# Patient Record
Sex: FEMALE | Race: WHITE | Employment: OTHER | URBAN - METROPOLITAN AREA
[De-identification: names, ages, dates, MRNs, and addresses within clinical notes are randomized per-mention and may not be internally consistent; named-entity substitution may affect disease eponyms.]

---

## 2018-09-13 PROBLEM — F32.A MILD DEPRESSION: Status: ACTIVE | Noted: 2018-09-13

## 2022-02-14 ENCOUNTER — HOSPITAL ENCOUNTER (OUTPATIENT)
Dept: GENERAL RADIOLOGY | Age: 72
Discharge: HOME | End: 2022-02-14
Payer: MEDICARE

## 2022-02-14 DIAGNOSIS — M54.16 LUMBAR RADICULOPATHY: ICD-10-CM

## 2022-02-14 DIAGNOSIS — M54.50 LOW BACK PAIN: ICD-10-CM

## 2022-02-14 PROCEDURE — 72110 X-RAY EXAM L-2 SPINE 4/>VWS: CPT

## 2022-02-15 ENCOUNTER — TRANSCRIBE ORDER (OUTPATIENT)
Dept: SCHEDULING | Age: 72
End: 2022-02-15

## 2022-02-15 DIAGNOSIS — M54.16 LUMBAR RADICULOPATHY: Primary | ICD-10-CM

## 2022-02-24 ENCOUNTER — NURSE TRIAGE (OUTPATIENT)
Dept: OTHER | Facility: CLINIC | Age: 72
End: 2022-02-24

## 2022-02-24 NOTE — TELEPHONE ENCOUNTER
Received call from [address] at [facility] with Red Flag Complaint. Subjective: Caller states \"I was hurting yesterday and I was weak and I slid off the couch. I just have had a fever all night, and my eyes hurt (chronic dry eyes), my nose is congested. \"     Current Symptoms: weakness, body pain, congestion, eyes are dry, fever of 102 (at 0215)    Onset: 1 day ago; gradual    Associated Symptoms: NA    Pain Severity: 8/10; aching; Chronic and additional pain    Temperature: 102 orally    What has been tried: Asprin    LMP: NA Pregnant: NA    Recommended disposition: to be seen to within the next 4 hours    Care advice provided, patient verbalizes understanding; denies any other questions or concerns; instructed to call back for any new or worsening symptoms. Patient/Caller agrees with recommended disposition; writer provided warm transfer to [facility] at [facility] for appointment scheduling    Attention Provider: Thank you for allowing me to participate in the care of your patient. The patient was connected to triage in response to information provided to the [facility]. Please do not respond through this encounter as the response is not directed to a shared pool.       Reason for Disposition   [1] Fever > 101 F (38.3 C) AND [2] age > 60 years    Protocols used: CORONAVIRUS (COVID-19) DIAGNOSED OR SUSPECTED-ADULT-AH

## 2022-03-19 PROBLEM — F32.A MILD DEPRESSION: Status: ACTIVE | Noted: 2018-09-13

## 2022-03-26 ENCOUNTER — NURSE TRIAGE (OUTPATIENT)
Dept: OTHER | Facility: CLINIC | Age: 72
End: 2022-03-26

## 2022-03-26 NOTE — TELEPHONE ENCOUNTER
Received call from [place] at [facility], caller not on line. Complaint: Leg pain from known DVT    Practice Name: [facility]    Caller's telephone number verified as [phone]    Connected with caller via phone, please see below triage       Subjective: Caller states \"Leg pain\"     Current Symptoms: Seen in the ED 3/23, dx left leg DVT. Started having reflux this afternoon and left leg pain getting worse. Swelling started back up today, states she has been more active yesterday. Onset: a few days ago; worsening    Associated Symptoms: NA    Pain Severity: 6/10; pain; constant    Temperature: denies fever    What has been tried: None    LMP: NA Pregnant: NA    Recommended disposition: See HCP within 4 Hours (or PCP triage). Advised to go to ED given that office is closed. Care advice provided, patient verbalizes understanding; denies any other questions or concerns; instructed to call back for any new or worsening symptoms. Patient/caller agrees to proceed to nearest Emergency Department    Attention Provider: Thank you for allowing me to participate in the care of your patient. The patient was connected to triage in response to information provided to the [facility]. Please do not respond through this encounter as the response is not directed to a shared pool.       Reason for Disposition   History of prior \"blood clot\" in leg or lungs (i.e., deep vein thrombosis, pulmonary embolism)    Protocols used: LEG PAIN-ADULT-AH

## 2022-05-24 ENCOUNTER — OFFICE VISIT (OUTPATIENT)
Dept: FAMILY MEDICINE CLINIC | Facility: CLINIC | Age: 72
End: 2022-05-24
Payer: MEDICARE

## 2022-05-24 VITALS
DIASTOLIC BLOOD PRESSURE: 66 MMHG | BODY MASS INDEX: 29.19 KG/M2 | WEIGHT: 171 LBS | SYSTOLIC BLOOD PRESSURE: 120 MMHG | HEIGHT: 64 IN

## 2022-05-24 DIAGNOSIS — R63.1 POLYDIPSIA: ICD-10-CM

## 2022-05-24 DIAGNOSIS — K21.9 GASTROESOPHAGEAL REFLUX DISEASE WITHOUT ESOPHAGITIS: ICD-10-CM

## 2022-05-24 DIAGNOSIS — R35.89 POLYURIA: Primary | ICD-10-CM

## 2022-05-24 LAB
BILIRUBIN, URINE, POC: NEGATIVE
BLOOD URINE, POC: NEGATIVE
EST. AVERAGE GLUCOSE BLD GHB EST-MCNC: 103 MG/DL
GLUCOSE SERPL-MCNC: 108 MG/DL (ref 65–100)
GLUCOSE URINE, POC: NEGATIVE
HBA1C MFR BLD: 5.2 % (ref 4.2–6.3)
KETONES, URINE, POC: NEGATIVE
LEUKOCYTE ESTERASE, URINE, POC: NEGATIVE
NITRITE, URINE, POC: NEGATIVE
PH, URINE, POC: 7 (ref 4.6–8)
PROTEIN,URINE, POC: NEGATIVE
SPECIFIC GRAVITY, URINE, POC: 1.01 (ref 1–1.03)
URINALYSIS CLARITY, POC: CLEAR
URINALYSIS COLOR, POC: NORMAL
UROBILINOGEN, POC: NORMAL

## 2022-05-24 PROCEDURE — 1090F PRES/ABSN URINE INCON ASSESS: CPT | Performed by: FAMILY MEDICINE

## 2022-05-24 PROCEDURE — 1036F TOBACCO NON-USER: CPT | Performed by: FAMILY MEDICINE

## 2022-05-24 PROCEDURE — G8417 CALC BMI ABV UP PARAM F/U: HCPCS | Performed by: FAMILY MEDICINE

## 2022-05-24 PROCEDURE — G8428 CUR MEDS NOT DOCUMENT: HCPCS | Performed by: FAMILY MEDICINE

## 2022-05-24 PROCEDURE — G8400 PT W/DXA NO RESULTS DOC: HCPCS | Performed by: FAMILY MEDICINE

## 2022-05-24 PROCEDURE — 3017F COLORECTAL CA SCREEN DOC REV: CPT | Performed by: FAMILY MEDICINE

## 2022-05-24 PROCEDURE — 99214 OFFICE O/P EST MOD 30 MIN: CPT | Performed by: FAMILY MEDICINE

## 2022-05-24 PROCEDURE — 81001 URINALYSIS AUTO W/SCOPE: CPT | Performed by: FAMILY MEDICINE

## 2022-05-24 PROCEDURE — 1123F ACP DISCUSS/DSCN MKR DOCD: CPT | Performed by: FAMILY MEDICINE

## 2022-05-24 RX ORDER — TRAMADOL HYDROCHLORIDE 50 MG/1
TABLET ORAL
COMMUNITY
Start: 2022-04-29

## 2022-05-24 RX ORDER — TRIAMCINOLONE ACETONIDE 1 MG/G
CREAM TOPICAL
COMMUNITY
Start: 2022-02-22

## 2022-05-24 RX ORDER — HYDROXYCHLOROQUINE SULFATE 200 MG/1
TABLET, FILM COATED ORAL DAILY
COMMUNITY
Start: 2015-09-24

## 2022-05-24 RX ORDER — PANTOPRAZOLE SODIUM 40 MG/1
40 TABLET, DELAYED RELEASE ORAL DAILY
Qty: 90 TABLET | Refills: 2 | Status: SHIPPED | OUTPATIENT
Start: 2022-05-24 | End: 2022-06-09 | Stop reason: SDUPTHER

## 2022-05-24 RX ORDER — CYCLOSPORINE 0.5 MG/ML
EMULSION OPHTHALMIC
COMMUNITY
Start: 2019-09-21

## 2022-05-24 ASSESSMENT — ENCOUNTER SYMPTOMS
COUGH: 0
SINUS PAIN: 0
DIARRHEA: 0
ABDOMINAL PAIN: 0
SHORTNESS OF BREATH: 0
RHINORRHEA: 0

## 2022-05-24 NOTE — PROGRESS NOTES
PROGRESS NOTE    SUBJECTIVE:   Layla Sotomayor is a 73 y.o. female seen for a follow up visit regarding the following chief complaint:     Chief Complaint   Patient presents with    Cystitis     increase urinary frecuency, denies dysuria           HPI  Patient presents office complaining of increased urinary frequency polyuria polydipsia and wants to know if COVID can give you diabetes even though she has a family history of diabetes we had a long discussion about that by the way    Past Medical History, Past Surgical History, Family history, Social History, and Medications were all reviewed with the patient today and updated as necessary. Current Outpatient Medications   Medication Sig Dispense Refill    apixaban (ELIQUIS) 5 MG TABS tablet Take 5 mg by mouth 2 times daily      cycloSPORINE (RESTASIS) 0.05 % ophthalmic emulsion       hydroxychloroquine (PLAQUENIL) 200 mg tablet       traMADol (ULTRAM) 50 MG tablet TAKE 1 TABLET BY MOUTH 4 TIMES DAILY AS NEEDED FOR PAIN      triamcinolone (KENALOG) 0.1 % cream MIX WITH ANY MOISTURIZER AND APPLY CREAM EXTERNALLY TO AFFECTED AREA THREE TIMES DAILY      pantoprazole (PROTONIX) 40 MG tablet Take 1 tablet by mouth daily TAKE 1 TABLET EVERY DAY 90 tablet 2    Calcium Carbonate-Vitamin D (OYSTER SHELL CALCIUM/D) 500-200 MG-UNIT TABS Take by mouth      vitamin D3 (CHOLECALCIFEROL) 10 MCG (400 UNIT) TABS tablet Take by mouth      citalopram (CELEXA) 40 MG tablet Take 40 mg by mouth      diazePAM (VALIUM) 10 MG tablet Take 10 mg by mouth.  fluticasone (FLONASE) 50 MCG/ACT nasal spray 2 sprays in each nostril daily      gabapentin (NEURONTIN) 400 MG capsule Take 400 mg by mouth 4 times daily.       levothyroxine (SYNTHROID) 50 MCG tablet TAKE 1 TABLET BY MOUTH DAILY (BEFORE BREAKFAST) FOR HYPOTHYROIDISM      loperamide (IMODIUM) 2 MG capsule Take 2 mg by mouth 4 times daily as needed      naloxone 4 MG/0.1ML LIQD nasal spray ADMINISTER A SINGLE SPRAY IN ONE NOSTRIL UPON SIGNS OF OPIOID OVERDOSE. CALL 911. REPEAT AFTER 3 MINUTES IF NO RESPONSE.  risperiDONE (RISPERDAL) 1 MG tablet Take 1 mg by mouth      spironolactone (ALDACTONE) 50 MG tablet Take 1 tablet by mouth 3 times daily       No current facility-administered medications for this visit.      Allergies   Allergen Reactions    Hydrocodone-Acetaminophen Other (See Comments)    Thimerosal Shortness Of Breath and Other (See Comments)     Localized redness  Other reaction(s): redness/burning    Adhesive Tape Other (See Comments)     localized redness and blisters - paper tape is ok    Codeine Itching     Patient Active Problem List   Diagnosis    Chronic pain    Autoimmune disease ([facility] [address]    GERD (gastroesophageal reflux disease)    CTS (carpal tunnel syndrome)    Anxiety    Acquired hypothyroidism    Depression    TMJ (temporomandibular joint syndrome)    Fibromyalgia    Raynaud phenomenon    Mild depression (HCC)    Sleep apnea    Psychotic disorder (HCC)    IBS (irritable bowel syndrome)    Arthritis    Sjogren-Belkys syndrome     Past Medical History:   Diagnosis Date    Acquired hypothyroidism     Anxiety     Arthritis     Back and extremities    Autoimmune disease (HCC)     Chronic pain     Fibromyalgia    CTS (carpal tunnel syndrome)     Depression     Fibromyalgia     GERD (gastroesophageal reflux disease)     Also has IBS    IBS (irritable bowel syndrome)     Menopause     Other ill-defined conditions(799.89)     Raynaud's and peripheral neuropathy    Psychotic disorder (HCC)     sechizophrinia    Raynaud phenomenon     Sjogren-Belkys syndrome     Sleep apnea     Thyroid disease     Hypothyroid    TMJ (temporomandibular joint syndrome)     Unspecified sleep apnea     uses CPAP - will bring DOS    Varicose veins      Past Surgical History:   Procedure Laterality Date    BREAST SURGERY  1983 - placed 1983    breast implants - removed 2005    CHOLECYSTECTOMY 2008    COLONOSCOPY  10/30/2014    Dr. Bjorn Cushing in 10 years.  GI      anal sphincter repair x 2     HEENT  1970's    deviated septum repair    US BREAST NEEDLE BIOPSY LEFT Left 2021    US GUIDE INTACT BREAST BIOPSY LT [facility] AMB HISTORICAL     Family History   Problem Relation Age of Onset    Other Neg Hx     Post-op Cognitive Dysfunction Neg Hx     Emergence Delirium Neg Hx     Post-op Nausea/Vomiting Neg Hx     Delayed Awakening Neg Hx     Pseudochol. Deficiency Neg Hx     Malig Hypertherm Neg Hx     Tuberculosis Other     No Known Problems Mother     No Known Problems Father      Social History     Tobacco Use    Smoking status: Former Smoker     Packs/day: 0.05     Start date: 2001     Quit date: 2008     Years since quittin.6    Smokeless tobacco: Never Used    Tobacco comment: Quit smoking: currently quitting   Substance Use Topics    Alcohol use: No     Alcohol/week: 0.0 standard drinks         Review of Systems   Constitutional: Negative for chills, fatigue and fever. HENT: Negative for congestion, rhinorrhea and sinus pain. Eyes: Negative for visual disturbance. Respiratory: Negative for cough and shortness of breath. Cardiovascular: Negative for chest pain. Gastrointestinal: Negative for abdominal pain and diarrhea. Endocrine: Positive for polydipsia and polyuria. Genitourinary: Negative for dysuria. Musculoskeletal: Negative for arthralgias and myalgias. Neurological: Negative for dizziness, weakness and headaches. Psychiatric/Behavioral: Negative. OBJECTIVE:  /66 (Site: Right Lower Arm, Position: Sitting, Cuff Size: Small Adult)   Ht 5' 4\" (1.626 m)   Wt 171 lb (77.6 kg)   BMI 29.35 kg/m²      Physical Exam  Constitutional:       Appearance: Normal appearance. HENT:      Head: Normocephalic and atraumatic. Cardiovascular:      Pulses: Normal pulses. Heart sounds: Normal heart sounds.    Pulmonary: Effort: Pulmonary effort is normal.      Breath sounds: Normal breath sounds. Skin:     General: Skin is warm and dry. Neurological:      General: No focal deficit present. Mental Status: She is alert and oriented to person, place, and time. Mental status is at baseline. Medical problems and test results were reviewed with the patient today. Recent Results (from the past 672 hour(s))   AMB POC URINALYSIS DIP STICK AUTO W/ MICRO    Collection Time: 05/24/22  2:24 PM   Result Value Ref Range    Color (UA POC) Light Yellow     Clarity (UA POC) Clear     Glucose, Urine, POC Negative Negative    Bilirubin, Urine, POC Negative Negative    KETONES, Urine, POC Negative Negative    Specific Gravity, Urine, POC 1.015 1.001 - 1.035    Blood (UA POC) Negative Negative    pH, Urine, POC 7.0 4.6 - 8.0    Protein, Urine, POC Negative Negative    Urobilinogen, POC 0.2 mg/dL     Nitrite, Urine, POC Negative Negative    Leukocyte Esterase, Urine, POC Negative Negative       ASSESSMENT and PLAN    Visit Diagnoses and Associated Orders     Polyuria    -  Primary    AMB POC URINALYSIS DIP STICK AUTO W/ MICRO [09816 CPT(R)]      Glucose, Random [56753 Custom]           Gastroesophageal reflux disease without esophagitis        pantoprazole (PROTONIX) 40 MG tablet [56196]           Polydipsia        Glucose, Random [12528 Custom]           ORDERS WITHOUT AN ASSOCIATED DIAGNOSIS    apixaban (ELIQUIS) 5 MG TABS tablet [662280]      cycloSPORINE (RESTASIS) 0.05 % ophthalmic emulsion [21901]      hydroxychloroquine (PLAQUENIL) 200 mg tablet [70170]      traMADol (ULTRAM) 50 MG tablet [03934]      triamcinolone (KENALOG) 0.1 % cream [8113]               Charlene Cortes was seen today for cystitis. Diagnoses and all orders for this visit:    Polyuria  -     AMB POC URINALYSIS DIP STICK AUTO W/ MICRO  -     Glucose, Random    Gastroesophageal reflux disease without esophagitis  -     pantoprazole (PROTONIX) 40 MG tablet;  Take 1 tablet by mouth daily TAKE 1 TABLET EVERY DAY    Polydipsia  -     Glucose, Random

## 2022-05-29 DIAGNOSIS — E55.9 VITAMIN D DEFICIENCY: ICD-10-CM

## 2022-05-29 DIAGNOSIS — Z11.59 NEED FOR HEPATITIS C SCREENING TEST: ICD-10-CM

## 2022-05-29 DIAGNOSIS — Z00.00 MEDICARE ANNUAL WELLNESS VISIT, SUBSEQUENT: ICD-10-CM

## 2022-05-29 DIAGNOSIS — E03.9 ACQUIRED HYPOTHYROIDISM: Primary | ICD-10-CM

## 2022-06-01 DIAGNOSIS — E03.9 ACQUIRED HYPOTHYROIDISM: ICD-10-CM

## 2022-06-01 DIAGNOSIS — Z00.00 MEDICARE ANNUAL WELLNESS VISIT, SUBSEQUENT: ICD-10-CM

## 2022-06-01 DIAGNOSIS — Z11.59 NEED FOR HEPATITIS C SCREENING TEST: ICD-10-CM

## 2022-06-01 DIAGNOSIS — E55.9 VITAMIN D DEFICIENCY: ICD-10-CM

## 2022-06-01 LAB
25(OH)D3 SERPL-MCNC: 47.4 NG/ML (ref 30–100)
ALBUMIN SERPL-MCNC: 4 G/DL (ref 3.2–4.6)
ALBUMIN/GLOB SERPL: 1.2 {RATIO} (ref 1.2–3.5)
ALP SERPL-CCNC: 90 U/L (ref 50–136)
ALT SERPL-CCNC: 34 U/L (ref 12–65)
ANION GAP SERPL CALC-SCNC: 9 MMOL/L (ref 7–16)
AST SERPL-CCNC: 27 U/L (ref 15–37)
BASOPHILS # BLD: 0.1 K/UL (ref 0–0.2)
BASOPHILS NFR BLD: 1 % (ref 0–2)
BILIRUB SERPL-MCNC: 0.8 MG/DL (ref 0.2–1.1)
BUN SERPL-MCNC: 11 MG/DL (ref 8–23)
CALCIUM SERPL-MCNC: 9.2 MG/DL (ref 8.3–10.4)
CHLORIDE SERPL-SCNC: 102 MMOL/L (ref 98–107)
CHOLEST SERPL-MCNC: 203 MG/DL
CO2 SERPL-SCNC: 28 MMOL/L (ref 21–32)
CREAT SERPL-MCNC: 1.2 MG/DL (ref 0.6–1)
DIFFERENTIAL METHOD BLD: NORMAL
EOSINOPHIL # BLD: 0.2 K/UL (ref 0–0.8)
EOSINOPHIL NFR BLD: 3 % (ref 0.5–7.8)
ERYTHROCYTE [DISTWIDTH] IN BLOOD BY AUTOMATED COUNT: 12.6 % (ref 11.9–14.6)
GLOBULIN SER CALC-MCNC: 3.3 G/DL (ref 2.3–3.5)
GLUCOSE SERPL-MCNC: 96 MG/DL (ref 65–100)
HCT VFR BLD AUTO: 44.4 % (ref 35.8–46.3)
HCV AB SER QL: NONREACTIVE
HDLC SERPL-MCNC: 50 MG/DL (ref 40–60)
HDLC SERPL: 4.1 {RATIO}
HGB BLD-MCNC: 14.7 G/DL (ref 11.7–15.4)
IMM GRANULOCYTES # BLD AUTO: 0 K/UL (ref 0–0.5)
IMM GRANULOCYTES NFR BLD AUTO: 1 % (ref 0–5)
LDLC SERPL CALC-MCNC: ABNORMAL MG/DL
LDLC SERPL DIRECT ASSAY-MCNC: 101 MG/DL
LYMPHOCYTES # BLD: 2 K/UL (ref 0.5–4.6)
LYMPHOCYTES NFR BLD: 28 % (ref 13–44)
MCH RBC QN AUTO: 31.2 PG (ref 26.1–32.9)
MCHC RBC AUTO-ENTMCNC: 33.1 G/DL (ref 31.4–35)
MCV RBC AUTO: 94.3 FL (ref 79.6–97.8)
MONOCYTES # BLD: 0.9 K/UL (ref 0.1–1.3)
MONOCYTES NFR BLD: 12 % (ref 4–12)
NEUTS SEG # BLD: 3.9 K/UL (ref 1.7–8.2)
NEUTS SEG NFR BLD: 55 % (ref 43–78)
NRBC # BLD: 0 K/UL (ref 0–0.2)
PLATELET # BLD AUTO: 257 K/UL (ref 150–450)
PMV BLD AUTO: 10.4 FL (ref 9.4–12.3)
POTASSIUM SERPL-SCNC: 4.1 MMOL/L (ref 3.5–5.1)
PROT SERPL-MCNC: 7.3 G/DL (ref 6.3–8.2)
RBC # BLD AUTO: 4.71 M/UL (ref 4.05–5.2)
SODIUM SERPL-SCNC: 139 MMOL/L (ref 136–145)
TRIGL SERPL-MCNC: 517 MG/DL (ref 35–150)
TSH, 3RD GENERATION: 1.53 UIU/ML (ref 0.36–3.74)
VLDLC SERPL CALC-MCNC: 103.4 MG/DL (ref 6–23)
WBC # BLD AUTO: 7.2 K/UL (ref 4.3–11.1)

## 2022-06-03 ENCOUNTER — TELEMEDICINE (OUTPATIENT)
Dept: FAMILY MEDICINE CLINIC | Facility: CLINIC | Age: 72
End: 2022-06-03
Payer: MEDICARE

## 2022-06-03 DIAGNOSIS — R73.9 HYPERGLYCEMIA: Primary | ICD-10-CM

## 2022-06-03 DIAGNOSIS — Z00.00 ROUTINE GENERAL MEDICAL EXAMINATION AT A HEALTH CARE FACILITY: ICD-10-CM

## 2022-06-03 PROCEDURE — 99442 PR PHYS/QHP TELEPHONE EVALUATION 11-20 MIN: CPT | Performed by: FAMILY MEDICINE

## 2022-06-03 ASSESSMENT — ENCOUNTER SYMPTOMS
VOMITING: 0
NAUSEA: 0
SHORTNESS OF BREATH: 0

## 2022-06-03 NOTE — PROGRESS NOTES
PROGRESS NOTE  This visit was conducted via the phone with patient's consent to the visit and all associated charges to reduce the patient's risk of exposure to COVID-19 and continuity of care for an established patient. She and/or her healthcare decision maker is aware that this patient-initiated phone encounter is a billable service, with coverage as determined by her insurance carrier. She is aware that she may receive a bill and has provided verbal consent to proceed: Yes    Vitals and physical exam deferred due to telephone visit. Total Time: minutes: 11-20 minutes. SUBJECTIVE:   Sergey Dominguez is a 73 y.o. female seen for a follow up visit regarding the following chief complaint:           HPI patient is doing a phone call visit to go over her lab results without complaints      Past Medical History, Past Surgical History, Family history, Social History, and Medications were all reviewed with the patient today and updated as necessary. Current Outpatient Medications   Medication Sig Dispense Refill    apixaban (ELIQUIS) 5 MG TABS tablet Take 5 mg by mouth 2 times daily      cycloSPORINE (RESTASIS) 0.05 % ophthalmic emulsion       hydroxychloroquine (PLAQUENIL) 200 mg tablet       traMADol (ULTRAM) 50 MG tablet TAKE 1 TABLET BY MOUTH 4 TIMES DAILY AS NEEDED FOR PAIN      triamcinolone (KENALOG) 0.1 % cream MIX WITH ANY MOISTURIZER AND APPLY CREAM EXTERNALLY TO AFFECTED AREA THREE TIMES DAILY      pantoprazole (PROTONIX) 40 MG tablet Take 1 tablet by mouth daily TAKE 1 TABLET EVERY DAY 90 tablet 2    Calcium Carbonate-Vitamin D (OYSTER SHELL CALCIUM/D) 500-200 MG-UNIT TABS Take by mouth      vitamin D3 (CHOLECALCIFEROL) 10 MCG (400 UNIT) TABS tablet Take by mouth      citalopram (CELEXA) 40 MG tablet Take 40 mg by mouth      diazePAM (VALIUM) 10 MG tablet Take 10 mg by mouth.       fluticasone (FLONASE) 50 MCG/ACT nasal spray 2 sprays in each nostril daily      gabapentin (NEURONTIN) 400 MG capsule Take 400 mg by mouth 4 times daily.  levothyroxine (SYNTHROID) 50 MCG tablet TAKE 1 TABLET BY MOUTH DAILY (BEFORE BREAKFAST) FOR HYPOTHYROIDISM      loperamide (IMODIUM) 2 MG capsule Take 2 mg by mouth 4 times daily as needed      naloxone 4 MG/0.1ML LIQD nasal spray ADMINISTER A SINGLE SPRAY IN ONE NOSTRIL UPON SIGNS OF OPIOID OVERDOSE. CALL 911. REPEAT AFTER 3 MINUTES IF NO RESPONSE.  risperiDONE (RISPERDAL) 1 MG tablet Take 1 mg by mouth      spironolactone (ALDACTONE) 50 MG tablet Take 1 tablet by mouth 3 times daily       No current facility-administered medications for this visit.      Allergies   Allergen Reactions    Hydrocodone-Acetaminophen Other (See Comments)    Thimerosal Shortness Of Breath and Other (See Comments)     Localized redness  Other reaction(s): redness/burning    Adhesive Tape Other (See Comments)     localized redness and blisters - paper tape is ok    Codeine Itching     Patient Active Problem List   Diagnosis    Chronic pain    Autoimmune disease ([facility] [address]    GERD (gastroesophageal reflux disease)    CTS (carpal tunnel syndrome)    Anxiety    Acquired hypothyroidism    Depression    TMJ (temporomandibular joint syndrome)    Fibromyalgia    Raynaud phenomenon    Mild depression (HCC)    Sleep apnea    Psychotic disorder (HCC)    IBS (irritable bowel syndrome)    Arthritis    Sjogren-Belkys syndrome     Past Medical History:   Diagnosis Date    Acquired hypothyroidism     Anxiety     Arthritis     Back and extremities    Autoimmune disease (HCC)     Chronic pain     Fibromyalgia    CTS (carpal tunnel syndrome)     Depression     Fibromyalgia     GERD (gastroesophageal reflux disease)     Also has IBS    IBS (irritable bowel syndrome)     Menopause     Other ill-defined conditions(077.36)     Raynaud's and peripheral neuropathy    Psychotic disorder (HCC)     sechizophrinia    Raynaud phenomenon     Sjogren-Belkys syndrome     Sleep apnea     Thyroid disease     Hypothyroid    TMJ (temporomandibular joint syndrome)     Unspecified sleep apnea     uses CPAP - will bring DOS    Varicose veins      Past Surgical History:   Procedure Laterality Date   [address] - placed     breast implants - removed     CHOLECYSTECTOMY  2008    COLONOSCOPY  10/30/2014    Dr. Daisy Alfonso in 10 years.  GI      anal sphincter repair x 2     HEENT      deviated septum repair    US BREAST NEEDLE BIOPSY LEFT Left 2021    US GUIDE INTACT BREAST BIOPSY LT [facility] AMB HISTORICAL     Family History   Problem Relation Age of Onset    Other Neg Hx     Post-op Cognitive Dysfunction Neg Hx     Emergence Delirium Neg Hx     Post-op Nausea/Vomiting Neg Hx     Delayed Awakening Neg Hx     Pseudochol. Deficiency Neg Hx     Malig Hypertherm Neg Hx     Tuberculosis Other     No Known Problems Mother     No Known Problems Father      Social History     Tobacco Use    Smoking status: Former Smoker     Packs/day: 0.05     Start date: 2001     Quit date: 2008     Years since quittin.6    Smokeless tobacco: Never Used    Tobacco comment: Quit smoking: currently quitting   Substance Use Topics    Alcohol use: No     Alcohol/week: 0.0 standard drinks         Review of Systems   Constitutional: Negative for fatigue and fever. Respiratory: Negative for shortness of breath. Cardiovascular: Negative for chest pain. Gastrointestinal: Negative for nausea and vomiting. OBJECTIVE:  There were no vitals taken for this visit. Physical Exam     Medical problems and test results were reviewed with the patient today.      Recent Results (from the past 672 hour(s))   AMB POC URINALYSIS DIP STICK AUTO W/ MICRO    Collection Time: 22  2:24 PM   Result Value Ref Range    Color (UA POC) Light Yellow     Clarity (UA POC) Clear     Glucose, Urine, POC Negative Negative    Bilirubin, Urine, POC Negative Negative    KETONES, Urine, POC Negative Negative    Specific Gravity, Urine, POC 1.015 1.001 - 1.035    Blood (UA POC) Negative Negative    pH, Urine, POC 7.0 4.6 - 8.0    Protein, Urine, POC Negative Negative    Urobilinogen, POC 0.2 mg/dL     Nitrite, Urine, POC Negative Negative    Leukocyte Esterase, Urine, POC Negative Negative   Glucose, Random    Collection Time: 05/24/22  3:29 PM   Result Value Ref Range    Glucose 108 (H) 65 - 100 mg/dL   Hemoglobin A1C    Collection Time: 05/24/22  3:29 PM   Result Value Ref Range    Hemoglobin A1C 5.2 4.20 - 6.30 %    eAG 103 mg/dL   TSH    Collection Time: 06/01/22  7:51 AM   Result Value Ref Range    TSH, 3RD GENERATION 1.530 0.358 - 3.740 uIU/mL   Hepatitis C Antibody    Collection Time: 06/01/22  7:51 AM   Result Value Ref Range    Hepatitis C Ab NONREACTIVE NONREACTIVE     Vitamin D 25 Hydroxy    Collection Time: 06/01/22  7:51 AM   Result Value Ref Range    Vit D, 25-Hydroxy 47.4 30.0 - 100.0 ng/mL   Lipid Panel    Collection Time: 06/01/22  7:51 AM   Result Value Ref Range    Cholesterol, Total 203 (H) <200 MG/DL    Triglycerides 517 (H) 35 - 150 MG/DL    HDL 50 40 - 60 MG/DL    LDL Calculated Not calculated due to elevated triglyceride level <100 MG/DL    VLDL Cholesterol Calculated 103.4 (H) 6.0 - 23.0 MG/DL    Chol/HDL Ratio 4.1     Comprehensive Metabolic Panel    Collection Time: 06/01/22  7:51 AM   Result Value Ref Range    Sodium 139 136 - 145 mmol/L    Potassium 4.1 3.5 - 5.1 mmol/L    Chloride 102 98 - 107 mmol/L    CO2 28 21 - 32 mmol/L    Anion Gap 9 7 - 16 mmol/L    Glucose 96 65 - 100 mg/dL    BUN 11 8 - 23 MG/DL    CREATININE 1.20 (H) 0.6 - 1.0 MG/DL    GFR  57 (L) >60 ml/min/1.73m2    GFR Non- 47 (L) >60 ml/min/1.73m2    Calcium 9.2 8.3 - 10.4 MG/DL    Total Bilirubin 0.8 0.2 - 1.1 MG/DL    ALT 34 12 - 65 U/L    AST 27 15 - 37 U/L    Alk Phosphatase 90 50 - 136 U/L    Total Protein 7.3 6.3 - 8.2 g/dL    Albumin 4.0 3.2 - 4.6 g/dL    Globulin 3.3 2.3 - 3.5 g/dL    Albumin/Globulin Ratio 1.2 1.2 - 3.5     CBC with Auto Differential    Collection Time: 06/01/22  7:51 AM   Result Value Ref Range    WBC 7.2 4.3 - 11.1 K/uL    RBC 4.71 4.05 - 5.2 M/uL    Hemoglobin 14.7 11.7 - 15.4 g/dL    Hematocrit 44.4 35.8 - 46.3 %    MCV 94.3 79.6 - 97.8 FL    MCH 31.2 26.1 - 32.9 PG    MCHC 33.1 31.4 - 35.0 g/dL    RDW 12.6 11.9 - 14.6 %    Platelets 847 035 - 866 K/uL    MPV 10.4 9.4 - 12.3 FL    nRBC 0.00 0.0 - 0.2 K/uL    Differential Type AUTOMATED      Seg Neutrophils 55 43 - 78 %    Lymphocytes 28 13 - 44 %    Monocytes 12 4.0 - 12.0 %    Eosinophils % 3 0.5 - 7.8 %    Basophils 1 0.0 - 2.0 %    Immature Granulocytes 1 0.0 - 5.0 %    Segs Absolute 3.9 1.7 - 8.2 K/UL    Absolute Lymph # 2.0 0.5 - 4.6 K/UL    Absolute Mono # 0.9 0.1 - 1.3 K/UL    Absolute Eos # 0.2 0.0 - 0.8 K/UL    Basophils Absolute 0.1 0.0 - 0.2 K/UL    Absolute Immature Granulocyte 0.0 0.0 - 0.5 K/UL   LDL Cholesterol, Direct    Collection Time: 06/01/22  7:51 AM   Result Value Ref Range    LDL Direct 101 (H) <100 mg/dl       ASSESSMENT and PLAN    Visit Diagnoses and Associated Orders     Hyperglycemia    -  Primary                  Diagnoses and all orders for this visit:    Hyperglycemia

## 2022-06-09 ENCOUNTER — OFFICE VISIT (OUTPATIENT)
Dept: FAMILY MEDICINE CLINIC | Facility: CLINIC | Age: 72
End: 2022-06-09
Payer: MEDICARE

## 2022-06-09 VITALS
SYSTOLIC BLOOD PRESSURE: 120 MMHG | HEIGHT: 64 IN | WEIGHT: 171 LBS | BODY MASS INDEX: 29.19 KG/M2 | DIASTOLIC BLOOD PRESSURE: 80 MMHG

## 2022-06-09 DIAGNOSIS — F41.9 ANXIETY: ICD-10-CM

## 2022-06-09 DIAGNOSIS — I82.409 DEEP VEIN THROMBOSIS (DVT) OF LOWER EXTREMITY, UNSPECIFIED CHRONICITY, UNSPECIFIED LATERALITY, UNSPECIFIED VEIN (HCC): ICD-10-CM

## 2022-06-09 DIAGNOSIS — E78.2 MIXED HYPERLIPIDEMIA: ICD-10-CM

## 2022-06-09 DIAGNOSIS — Z12.11 SPECIAL SCREENING FOR MALIGNANT NEOPLASMS, COLON: ICD-10-CM

## 2022-06-09 DIAGNOSIS — K21.9 GASTROESOPHAGEAL REFLUX DISEASE WITHOUT ESOPHAGITIS: ICD-10-CM

## 2022-06-09 DIAGNOSIS — F33.0 MILD EPISODE OF RECURRENT MAJOR DEPRESSIVE DISORDER (HCC): ICD-10-CM

## 2022-06-09 DIAGNOSIS — Z13.31 SCREENING FOR DEPRESSION: ICD-10-CM

## 2022-06-09 DIAGNOSIS — E55.9 VITAMIN D DEFICIENCY: ICD-10-CM

## 2022-06-09 DIAGNOSIS — Z12.31 SCREENING MAMMOGRAM FOR HIGH-RISK PATIENT: ICD-10-CM

## 2022-06-09 DIAGNOSIS — Z00.00 MEDICARE ANNUAL WELLNESS VISIT, SUBSEQUENT: ICD-10-CM

## 2022-06-09 DIAGNOSIS — Z00.00 ROUTINE GENERAL MEDICAL EXAMINATION AT A HEALTH CARE FACILITY: Primary | ICD-10-CM

## 2022-06-09 DIAGNOSIS — E03.9 ACQUIRED HYPOTHYROIDISM: ICD-10-CM

## 2022-06-09 LAB
BILIRUBIN, URINE, POC: ABNORMAL
BLOOD URINE, POC: ABNORMAL
GLUCOSE URINE, POC: NEGATIVE
KETONES, URINE, POC: ABNORMAL
LEUKOCYTE ESTERASE, URINE, POC: NEGATIVE
NITRITE, URINE, POC: NEGATIVE
PH, URINE, POC: 5.5 (ref 4.6–8)
PROTEIN,URINE, POC: ABNORMAL
SPECIFIC GRAVITY, URINE, POC: 1.03 (ref 1–1.03)
URINALYSIS CLARITY, POC: CLEAR
URINALYSIS COLOR, POC: YELLOW
UROBILINOGEN, POC: 0.2

## 2022-06-09 PROCEDURE — G0439 PPPS, SUBSEQ VISIT: HCPCS | Performed by: FAMILY MEDICINE

## 2022-06-09 PROCEDURE — 3017F COLORECTAL CA SCREEN DOC REV: CPT | Performed by: FAMILY MEDICINE

## 2022-06-09 PROCEDURE — 1123F ACP DISCUSS/DSCN MKR DOCD: CPT | Performed by: FAMILY MEDICINE

## 2022-06-09 PROCEDURE — 81003 URINALYSIS AUTO W/O SCOPE: CPT | Performed by: FAMILY MEDICINE

## 2022-06-09 RX ORDER — CITALOPRAM 40 MG/1
40 TABLET ORAL DAILY
Qty: 90 TABLET | Refills: 3 | Status: SHIPPED | OUTPATIENT
Start: 2022-06-09

## 2022-06-09 RX ORDER — DIAZEPAM 10 MG/1
10 TABLET ORAL DAILY
Qty: 30 TABLET | Refills: 5 | Status: SHIPPED | OUTPATIENT
Start: 2022-06-29 | End: 2022-07-29

## 2022-06-09 RX ORDER — LEVOTHYROXINE SODIUM 0.05 MG/1
50 TABLET ORAL DAILY
Qty: 90 TABLET | Refills: 3 | Status: SHIPPED | OUTPATIENT
Start: 2022-06-09

## 2022-06-09 RX ORDER — PANTOPRAZOLE SODIUM 40 MG/1
40 TABLET, DELAYED RELEASE ORAL DAILY
Qty: 90 TABLET | Refills: 3 | Status: SHIPPED | OUTPATIENT
Start: 2022-06-09

## 2022-06-09 ASSESSMENT — PATIENT HEALTH QUESTIONNAIRE - PHQ9
2. FEELING DOWN, DEPRESSED OR HOPELESS: 0
7. TROUBLE CONCENTRATING ON THINGS, SUCH AS READING THE NEWSPAPER OR WATCHING TELEVISION: 0
4. FEELING TIRED OR HAVING LITTLE ENERGY: 0
10. IF YOU CHECKED OFF ANY PROBLEMS, HOW DIFFICULT HAVE THESE PROBLEMS MADE IT FOR YOU TO DO YOUR WORK, TAKE CARE OF THINGS AT HOME, OR GET ALONG WITH OTHER PEOPLE: 0
8. MOVING OR SPEAKING SO SLOWLY THAT OTHER PEOPLE COULD HAVE NOTICED. OR THE OPPOSITE, BEING SO FIGETY OR RESTLESS THAT YOU HAVE BEEN MOVING AROUND A LOT MORE THAN USUAL: 0
SUM OF ALL RESPONSES TO PHQ QUESTIONS 1-9: 0
5. POOR APPETITE OR OVEREATING: 0
6. FEELING BAD ABOUT YOURSELF - OR THAT YOU ARE A FAILURE OR HAVE LET YOURSELF OR YOUR FAMILY DOWN: 0
SUM OF ALL RESPONSES TO PHQ QUESTIONS 1-9: 0
3. TROUBLE FALLING OR STAYING ASLEEP: 0
9. THOUGHTS THAT YOU WOULD BE BETTER OFF DEAD, OR OF HURTING YOURSELF: 0

## 2022-06-09 NOTE — PATIENT INSTRUCTIONS
Personalized Preventive Plan for Saba Zavala - 6/9/2022  Medicare offers a range of preventive health benefits. Some of the tests and screenings are paid in full while other may be subject to a deductible, co-insurance, and/or copay. Some of these benefits include a comprehensive review of your medical history including lifestyle, illnesses that may run in your family, and various assessments and screenings as appropriate. After reviewing your medical record and screening and assessments performed today your provider may have ordered immunizations, labs, imaging, and/or referrals for you. A list of these orders (if applicable) as well as your Preventive Care list are included within your After Visit Summary for your review. Other Preventive Recommendations:    · A preventive eye exam performed by an eye specialist is recommended every 1-2 years to screen for glaucoma; cataracts, macular degeneration, and other eye disorders. · A preventive dental visit is recommended every 6 months. · Try to get at least 150 minutes of exercise per week or 10,000 steps per day on a pedometer . · Order or download the FREE \"Exercise & Physical Activity: Your Everyday Guide\" from [employer]. Call 3-822.350.8691 or search [employer] online. · You need 7961-1422 mg of calcium and 2889-7355 IU of vitamin D per day. It is possible to meet your calcium requirement with diet alone, but a vitamin D supplement is usually necessary to meet this goal.  · When exposed to the sun, use a sunscreen that protects against both UVA and UVB radiation with an SPF of 30 or greater. Reapply every 2 to 3 hours or after sweating, drying off with a towel, or swimming. · Always wear a seat belt when traveling in a car. Always wear a helmet when riding a bicycle or motorcycle.

## 2022-06-09 NOTE — PROGRESS NOTES
Medicare Annual Wellness Visit    Mikel Arteaga is here for Medicare AWV and Discuss Labs    Assessment & Plan   Routine general medical examination at a health care facility  -     AMB POC URINALYSIS DIP STICK AUTO W/O MICRO  Gastroesophageal reflux disease without esophagitis  The following orders have not been finalized:  -     pantoprazole (PROTONIX) 40 MG tablet  Medicare annual wellness visit, subsequent  Vitamin D deficiency  Acquired hypothyroidism  The following orders have not been finalized:  -     levothyroxine (SYNTHROID) 50 MCG tablet  Mild episode of recurrent major depressive disorder ([facility] [address]  Screening for depression  Anxiety  The following orders have not been finalized:  -     citalopram (CELEXA) 40 MG tablet  -     diazePAM (VALIUM) 10 MG tablet  Deep vein thrombosis (DVT) of lower extremity, unspecified chronicity, unspecified laterality, unspecified vein ([facility]  The following orders have not been finalized:  -     apixaban (ELIQUIS) 5 MG TABS tablet      Recommendations for Preventive Services Due: see orders and patient instructions/AVS.  Recommended screening schedule for the next 5-10 years is provided to the patient in written form: see Patient Instructions/AVS.     Return for Medicare Annual Wellness Visit in 1 year. Subjective   Presents to the office today for complete physical without complaints    Patient's complete Health Risk Assessment and screening values have been reviewed and are found in Flowsheets. The following problems were reviewed today and where indicated follow up appointments were made and/or referrals ordered.     Positive Risk Factor Screenings with Interventions:             General Health and ACP:       Advance Directives     Power of  Living Will ACP-Advance Directive ACP-Power of     Not on File Not on File Not on File Not on File      General Health Risk Interventions:  · None              Objective   Vitals:    06/09/22 1516   BP: 120/80 Site: Left Upper Arm   Position: Sitting   Cuff Size: Small Adult   Weight: 171 lb (77.6 kg)   Height: 5' 4\" (1.626 m)      Body mass index is 29.35 kg/m². General Appearance: alert and oriented to person, place and time, well developed and well- nourished, in no acute distress  Skin: warm and dry, no rash or erythema  Head: normocephalic and atraumatic  Eyes: pupils equal, round, and reactive to light, extraocular eye movements intact, conjunctivae normal  ENT: tympanic membrane, external ear and ear canal normal bilaterally, nose without deformity, nasal mucosa and turbinates normal without polyps  Neck: supple and non-tender without mass, no thyromegaly or thyroid nodules, no cervical lymphadenopathy  Pulmonary/Chest: clear to auscultation bilaterally- no wheezes, rales or rhonchi, normal air movement, no respiratory distress  Cardiovascular: normal rate, regular rhythm, normal S1 and S2, no murmurs, rubs, clicks, or gallops, distal pulses intact, no carotid bruits  Abdomen: soft, non-tender, non-distended, normal bowel sounds, no masses or organomegaly  Extremities: no cyanosis, clubbing or edema  Musculoskeletal: normal range of motion, no joint swelling, deformity or tenderness  Neurologic: reflexes normal and symmetric, no cranial nerve deficit, gait, coordination and speech normal       Allergies   Allergen Reactions    Hydrocodone-Acetaminophen Other (See Comments)    Thimerosal Shortness Of Breath and Other (See Comments)     Localized redness  Other reaction(s): redness/burning    Adhesive Tape Other (See Comments)     localized redness and blisters - paper tape is ok    Codeine Itching     Prior to Visit Medications    Medication Sig Taking?  Authorizing Provider   apixaban (ELIQUIS) 5 MG TABS tablet Take 5 mg by mouth 2 times daily Yes Historical Provider, MD   cycloSPORINE (RESTASIS) 0.05 % ophthalmic emulsion  Yes Historical Provider, MD   hydroxychloroquine (PLAQUENIL) 200 mg tablet  Yes Historical Provider, MD   traMADol (ULTRAM) 50 MG tablet TAKE 1 TABLET BY MOUTH 4 TIMES DAILY AS NEEDED FOR PAIN Yes Historical Provider, MD   triamcinolone (KENALOG) 0.1 % cream MIX WITH ANY MOISTURIZER AND APPLY CREAM EXTERNALLY TO AFFECTED AREA THREE TIMES DAILY Yes Historical Provider, MD   pantoprazole (PROTONIX) 40 MG tablet Take 1 tablet by mouth daily TAKE 1 TABLET EVERY DAY Yes Eliud Amrbiz,    Calcium Carbonate-Vitamin D (OYSTER SHELL CALCIUM/D) 500-200 MG-UNIT TABS Take by mouth Yes Ar Automatic Reconciliation   vitamin D3 (CHOLECALCIFEROL) 10 MCG (400 UNIT) TABS tablet Take by mouth Yes Ar Automatic Reconciliation   citalopram (CELEXA) 40 MG tablet Take 40 mg by mouth Yes Ar Automatic Reconciliation   diazePAM (VALIUM) 10 MG tablet Take 10 mg by mouth. Yes Ar Automatic Reconciliation   gabapentin (NEURONTIN) 400 MG capsule Take 400 mg by mouth 4 times daily. Yes Ar Automatic Reconciliation   levothyroxine (SYNTHROID) 50 MCG tablet TAKE 1 TABLET BY MOUTH DAILY (BEFORE BREAKFAST) FOR HYPOTHYROIDISM Yes Ar Automatic Reconciliation   loperamide (IMODIUM) 2 MG capsule Take 2 mg by mouth 4 times daily as needed Yes Ar Automatic Reconciliation   naloxone 4 MG/0.1ML LIQD nasal spray ADMINISTER A SINGLE SPRAY IN ONE NOSTRIL UPON SIGNS OF OPIOID OVERDOSE. CALL 911. REPEAT AFTER 3 MINUTES IF NO RESPONSE.  Yes Ar Automatic Reconciliation   risperiDONE (RISPERDAL) 1 MG tablet Take 1 mg by mouth Yes Ar Automatic Reconciliation   spironolactone (ALDACTONE) 50 MG tablet Take 1 tablet by mouth 3 times daily Yes Ar Automatic Reconciliation       CareTeam (Including outside providers/suppliers regularly involved in providing care):   Patient Care Team:  Vivek Oneil DO as PCP - [address], DO as PCP - [facility][1] Empaneled Provider     Reviewed and updated this visit:  Tobacco  Med Hx  Surg Hx  Soc Hx  Fam Hx

## 2022-06-16 NOTE — PROGRESS NOTES
Patient requesting a Rx giving by another provider couple years ago  As per Dr Denise Morales  Patient needs to contact  provider who prescribe  medication

## 2022-06-28 ENCOUNTER — OFFICE VISIT (OUTPATIENT)
Dept: FAMILY MEDICINE CLINIC | Facility: CLINIC | Age: 72
End: 2022-06-28
Payer: MEDICARE

## 2022-06-28 VITALS
WEIGHT: 165 LBS | SYSTOLIC BLOOD PRESSURE: 120 MMHG | BODY MASS INDEX: 28.17 KG/M2 | HEIGHT: 64 IN | DIASTOLIC BLOOD PRESSURE: 80 MMHG

## 2022-06-28 DIAGNOSIS — N39.3 STRESS INCONTINENCE OF URINE: Primary | ICD-10-CM

## 2022-06-28 DIAGNOSIS — I82.409 DEEP VEIN THROMBOSIS (DVT) OF LOWER EXTREMITY, UNSPECIFIED CHRONICITY, UNSPECIFIED LATERALITY, UNSPECIFIED VEIN (HCC): ICD-10-CM

## 2022-06-28 PROBLEM — N18.30 CHRONIC RENAL DISEASE, STAGE III (HCC): Status: ACTIVE | Noted: 2022-06-28

## 2022-06-28 LAB
BILIRUBIN, URINE, POC: NEGATIVE
BLOOD URINE, POC: ABNORMAL
GLUCOSE URINE, POC: NEGATIVE
KETONES, URINE, POC: NEGATIVE
LEUKOCYTE ESTERASE, URINE, POC: ABNORMAL
NITRITE, URINE, POC: NEGATIVE
PH, URINE, POC: 7 (ref 4.6–8)
PROTEIN,URINE, POC: NEGATIVE
SPECIFIC GRAVITY, URINE, POC: 1.01 (ref 1–1.03)
URINALYSIS CLARITY, POC: CLEAR
URINALYSIS COLOR, POC: YELLOW
UROBILINOGEN, POC: ABNORMAL

## 2022-06-28 PROCEDURE — G8428 CUR MEDS NOT DOCUMENT: HCPCS | Performed by: FAMILY MEDICINE

## 2022-06-28 PROCEDURE — 99213 OFFICE O/P EST LOW 20 MIN: CPT | Performed by: FAMILY MEDICINE

## 2022-06-28 PROCEDURE — 1036F TOBACCO NON-USER: CPT | Performed by: FAMILY MEDICINE

## 2022-06-28 PROCEDURE — 3017F COLORECTAL CA SCREEN DOC REV: CPT | Performed by: FAMILY MEDICINE

## 2022-06-28 PROCEDURE — G8417 CALC BMI ABV UP PARAM F/U: HCPCS | Performed by: FAMILY MEDICINE

## 2022-06-28 PROCEDURE — 81003 URINALYSIS AUTO W/O SCOPE: CPT | Performed by: FAMILY MEDICINE

## 2022-06-28 PROCEDURE — 1090F PRES/ABSN URINE INCON ASSESS: CPT | Performed by: FAMILY MEDICINE

## 2022-06-28 PROCEDURE — 1123F ACP DISCUSS/DSCN MKR DOCD: CPT | Performed by: FAMILY MEDICINE

## 2022-06-28 PROCEDURE — G8400 PT W/DXA NO RESULTS DOC: HCPCS | Performed by: FAMILY MEDICINE

## 2022-06-28 RX ORDER — CEPHALEXIN 500 MG/1
CAPSULE ORAL
COMMUNITY
Start: 2022-06-27 | End: 2022-09-13 | Stop reason: ALTCHOICE

## 2022-06-28 RX ORDER — OXYBUTYNIN CHLORIDE 5 MG/1
5 TABLET, EXTENDED RELEASE ORAL DAILY
Qty: 90 TABLET | Refills: 3 | Status: SHIPPED | OUTPATIENT
Start: 2022-06-28 | End: 2022-07-25

## 2022-06-28 ASSESSMENT — ENCOUNTER SYMPTOMS
RHINORRHEA: 0
SINUS PAIN: 0
DIARRHEA: 0
ABDOMINAL PAIN: 0
COUGH: 0
SHORTNESS OF BREATH: 0

## 2022-06-28 NOTE — PROGRESS NOTES
PROGRESS NOTE    SUBJECTIVE:   Tanya Dubois is a 81 y.o. female seen for a follow up visit regarding the following chief complaint:     Chief Complaint   Patient presents with    Pulmonary Embolism     follow up.  Incontinence     urine incontinence           HPI patient presents office follow-up after being seen by vascular surgery is going to have some dental procedure done let her know about stopping Eliquis and taking her antibiotic that the dentist put her on also has questions about stress incontinence and wants to know if I can put her on something urinary stressing      Past Medical History, Past Surgical History, Family history, Social History, and Medications were all reviewed with the patient today and updated as necessary. Current Outpatient Medications   Medication Sig Dispense Refill    oxybutynin (DITROPAN XL) 5 MG extended release tablet Take 1 tablet by mouth daily 90 tablet 3    apixaban (ELIQUIS) 5 MG TABS tablet 1 p.o. daily 90 tablet 3    pantoprazole (PROTONIX) 40 MG tablet Take 1 tablet by mouth daily TAKE 1 TABLET EVERY DAY 90 tablet 3    citalopram (CELEXA) 40 MG tablet Take 1 tablet by mouth daily 90 tablet 3    [START ON 6/29/2022] diazePAM (VALIUM) 10 MG tablet Take 1 tablet by mouth daily for 30 days.  30 tablet 5    levothyroxine (SYNTHROID) 50 MCG tablet Take 1 tablet by mouth Daily TAKE 1 TABLET BY MOUTH DAILY (BEFORE BREAKFAST) FOR HYPOTHYROIDISM 90 tablet 3    cycloSPORINE (RESTASIS) 0.05 % ophthalmic emulsion       hydroxychloroquine (PLAQUENIL) 200 mg tablet       traMADol (ULTRAM) 50 MG tablet TAKE 1 TABLET BY MOUTH 4 TIMES DAILY AS NEEDED FOR PAIN      triamcinolone (KENALOG) 0.1 % cream MIX WITH ANY MOISTURIZER AND APPLY CREAM EXTERNALLY TO AFFECTED AREA THREE TIMES DAILY      Calcium Carbonate-Vitamin D (OYSTER SHELL CALCIUM/D) 500-200 MG-UNIT TABS Take by mouth      vitamin D3 (CHOLECALCIFEROL) 10 MCG (400 UNIT) TABS tablet Take by mouth      Sleep apnea     Thyroid disease     Hypothyroid    TMJ (temporomandibular joint syndrome)     Unspecified sleep apnea     uses CPAP - will bring DOS    Varicose veins      Past Surgical History:   Procedure Laterality Date   [address] - placed     breast implants - removed     CHOLECYSTECTOMY  2008    COLONOSCOPY  10/30/2014    Dr. Shruthi Burns in 10 years.  GI      anal sphincter repair x 2     HEENT  's    deviated septum repair    US BREAST NEEDLE BIOPSY LEFT Left 2021    US GUIDE INTACT BREAST BIOPSY LT [facility] CC AMB HISTORICAL     Family History   Problem Relation Age of Onset    Other Neg Hx     Post-op Cognitive Dysfunction Neg Hx     Emergence Delirium Neg Hx     Post-op Nausea/Vomiting Neg Hx     Delayed Awakening Neg Hx     Pseudochol. Deficiency Neg Hx     Malig Hypertherm Neg Hx     Tuberculosis Other     No Known Problems Mother     No Known Problems Father      Social History     Tobacco Use    Smoking status: Former Smoker     Packs/day: 0.05     Types: Cigarettes     Start date: 2001     Quit date: 2008     Years since quittin.7    Smokeless tobacco: Never Used    Tobacco comment: Quit smoking: currently quitting   Substance Use Topics    Alcohol use: No     Alcohol/week: 0.0 standard drinks         Review of Systems   Constitutional: Negative for chills, fatigue and fever. HENT: Negative for congestion, rhinorrhea and sinus pain. Eyes: Negative for visual disturbance. Respiratory: Negative for cough and shortness of breath. Cardiovascular: Negative for chest pain. Gastrointestinal: Negative for abdominal pain and diarrhea. Genitourinary: Positive for frequency and urgency. Negative for dysuria. Musculoskeletal: Negative for arthralgias and myalgias. Neurological: Negative for dizziness, weakness and headaches. Psychiatric/Behavioral: Negative.           OBJECTIVE:  /80 (Site: Left Upper Arm, Position: Sitting, Cuff Size: Small Adult)   Ht 5' 4\" (1.626 m)   Wt 165 lb (74.8 kg)   BMI 28.32 kg/m²      Physical Exam  Vitals and nursing note reviewed. Constitutional:       Appearance: Normal appearance. HENT:      Head: Normocephalic and atraumatic. Right Ear: Tympanic membrane normal.      Left Ear: Tympanic membrane normal.      Nose: Nose normal.      Mouth/Throat:      Mouth: Mucous membranes are moist.   Eyes:      Conjunctiva/sclera: Conjunctivae normal.      Pupils: Pupils are equal, round, and reactive to light. Cardiovascular:      Rate and Rhythm: Normal rate and regular rhythm. Pulses: Normal pulses. Heart sounds: Normal heart sounds. Pulmonary:      Effort: Pulmonary effort is normal.      Breath sounds: Normal breath sounds. Abdominal:      General: Abdomen is flat. Bowel sounds are normal.      Palpations: Abdomen is soft. Musculoskeletal:         General: Normal range of motion. Cervical back: Normal range of motion and neck supple. Skin:     General: Skin is warm and dry. Neurological:      General: No focal deficit present. Mental Status: She is alert and oriented to person, place, and time. Psychiatric:         Behavior: Behavior normal.          Medical problems and test results were reviewed with the patient today.      Recent Results (from the past 672 hour(s))   TSH    Collection Time: 06/01/22  7:51 AM   Result Value Ref Range    TSH, 3RD GENERATION 1.530 0.358 - 3.740 uIU/mL   Hepatitis C Antibody    Collection Time: 06/01/22  7:51 AM   Result Value Ref Range    Hepatitis C Ab NONREACTIVE NONREACTIVE     Vitamin D 25 Hydroxy    Collection Time: 06/01/22  7:51 AM   Result Value Ref Range    Vit D, 25-Hydroxy 47.4 30.0 - 100.0 ng/mL   Lipid Panel    Collection Time: 06/01/22  7:51 AM   Result Value Ref Range    Cholesterol, Total 203 (H) <200 MG/DL    Triglycerides 517 (H) 35 - 150 MG/DL    HDL 50 40 - 60 MG/DL    LDL Calculated Not calculated due to elevated triglyceride level <100 MG/DL    VLDL Cholesterol Calculated 103.4 (H) 6.0 - 23.0 MG/DL    Chol/HDL Ratio 4.1     Comprehensive Metabolic Panel    Collection Time: 06/01/22  7:51 AM   Result Value Ref Range    Sodium 139 136 - 145 mmol/L    Potassium 4.1 3.5 - 5.1 mmol/L    Chloride 102 98 - 107 mmol/L    CO2 28 21 - 32 mmol/L    Anion Gap 9 7 - 16 mmol/L    Glucose 96 65 - 100 mg/dL    BUN 11 8 - 23 MG/DL    CREATININE 1.20 (H) 0.6 - 1.0 MG/DL    GFR  57 (L) >60 ml/min/1.73m2    GFR Non- 47 (L) >60 ml/min/1.73m2    Calcium 9.2 8.3 - 10.4 MG/DL    Total Bilirubin 0.8 0.2 - 1.1 MG/DL    ALT 34 12 - 65 U/L    AST 27 15 - 37 U/L    Alk Phosphatase 90 50 - 136 U/L    Total Protein 7.3 6.3 - 8.2 g/dL    Albumin 4.0 3.2 - 4.6 g/dL    Globulin 3.3 2.3 - 3.5 g/dL    Albumin/Globulin Ratio 1.2 1.2 - 3.5     CBC with Auto Differential    Collection Time: 06/01/22  7:51 AM   Result Value Ref Range    WBC 7.2 4.3 - 11.1 K/uL    RBC 4.71 4.05 - 5.2 M/uL    Hemoglobin 14.7 11.7 - 15.4 g/dL    Hematocrit 44.4 35.8 - 46.3 %    MCV 94.3 79.6 - 97.8 FL    MCH 31.2 26.1 - 32.9 PG    MCHC 33.1 31.4 - 35.0 g/dL    RDW 12.6 11.9 - 14.6 %    Platelets 200 060 - 281 K/uL    MPV 10.4 9.4 - 12.3 FL    nRBC 0.00 0.0 - 0.2 K/uL    Differential Type AUTOMATED      Seg Neutrophils 55 43 - 78 %    Lymphocytes 28 13 - 44 %    Monocytes 12 4.0 - 12.0 %    Eosinophils % 3 0.5 - 7.8 %    Basophils 1 0.0 - 2.0 %    Immature Granulocytes 1 0.0 - 5.0 %    Segs Absolute 3.9 1.7 - 8.2 K/UL    Absolute Lymph # 2.0 0.5 - 4.6 K/UL    Absolute Mono # 0.9 0.1 - 1.3 K/UL    Absolute Eos # 0.2 0.0 - 0.8 K/UL    Basophils Absolute 0.1 0.0 - 0.2 K/UL    Absolute Immature Granulocyte 0.0 0.0 - 0.5 K/UL   LDL Cholesterol, Direct    Collection Time: 06/01/22  7:51 AM   Result Value Ref Range    LDL Direct 101 (H) <100 mg/dl   AMB POC URINALYSIS DIP STICK AUTO W/O MICRO    Collection Time: 06/09/22  3:06 PM   Result Value Ref Range    Color, Urine, POC Yellow     Clarity, Urine, POC Clear     Glucose, Urine, POC Negative Negative    Bilirubin, Urine, POC 1+ Negative    KETONES, Urine, POC 1+ Negative    Specific Gravity, Urine, POC 1.030 1.001 - 1.035    Blood, Urine, POC 1+ Negative    pH, Urine, POC 5.5 4.6 - 8.0    Protein, Urine, POC 1+ Negative    Urobilinogen, POC 0.2     Nitrate, Urine, POC Negative Negative    Leukocyte Esterase, Urine, POC Negative Negative   AMB POC URINALYSIS DIP STICK MANUAL W/O MICRO    Collection Time: 06/28/22  2:47 PM   Result Value Ref Range    Color (UA POC) Yellow     Clarity (UA POC) Clear     Glucose, Urine, POC Negative Negative    Bilirubin, Urine, POC Negative Negative    KETONES, Urine, POC Negative Negative    Specific Gravity, Urine, POC 1.010 1.001 - 1.035    Blood (UA POC) Trace Negative    pH, Urine, POC 7.0 4.6 - 8.0    Protein, Urine, POC Negative Negative    Urobilinogen, POC 0.2 mg/dL     Nitrite, Urine, POC Negative Negative    Leukocyte Esterase, Urine, POC Trace Negative       ASSESSMENT and PLAN    Visit Diagnoses and Associated Orders     Stress incontinence of urine    -  Primary    AMB POC URINALYSIS DIP STICK MANUAL W/O MICRO [89209 CPT(R)]      oxybutynin (DITROPAN XL) 5 MG extended release tablet [18923]           Deep vein thrombosis (DVT) of lower extremity, unspecified chronicity, unspecified laterality, unspecified vein (HCC)        apixaban (ELIQUIS) 5 MG TABS tablet [[id number]]           ORDERS WITHOUT AN ASSOCIATED DIAGNOSIS    cephALEXin (KEFLEX) 500 MG capsule [[id number]]        Recommended following up with Dr. Jeronimo Birch appreciate vascular surgery input one of the options of put her on 1 pill of Eliquis a day recommended for that for now since she is debating whether to come off of it when she is due but ultimately would appreciate what oncology's recommendations are supportive care given refilled her prescription for oxybutynin for her mixed urge and stress incontinence follow-up in the near future with her physical      Trudy Edenilson was seen today for pulmonary embolism and incontinence. Diagnoses and all orders for this visit:    Stress incontinence of urine  -     AMB POC URINALYSIS DIP STICK MANUAL W/O MICRO  -     oxybutynin (DITROPAN XL) 5 MG extended release tablet;  Take 1 tablet by mouth daily    Deep vein thrombosis (DVT) of lower extremity, unspecified chronicity, unspecified laterality, unspecified vein (HCC)  -     apixaban (ELIQUIS) 5 MG TABS tablet; 1 p.o. daily

## 2022-07-06 ENCOUNTER — HOSPITAL ENCOUNTER (OUTPATIENT)
Dept: ULTRASOUND IMAGING | Age: 72
Discharge: HOME | End: 2022-07-09

## 2022-07-06 DIAGNOSIS — I82.4Z2 ACUTE DEEP VEIN THROMBOSIS (DVT) OF DISTAL VEIN OF LEFT LOWER EXTREMITY (HCC): ICD-10-CM

## 2022-07-07 DIAGNOSIS — I82.4Y9 DEEP VEIN THROMBOSIS (DVT) OF PROXIMAL LOWER EXTREMITY, UNSPECIFIED CHRONICITY, UNSPECIFIED LATERALITY (HCC): Primary | ICD-10-CM

## 2022-07-07 NOTE — PATIENT INSTRUCTIONS
Patient Instructions from Today's Visit    Reason for Visit:  Follow up- DVT      Plan:  - your ultrasound is clear of evidence of clots. - we would like you to have labs drawn while you are off the anticoagulants next week. Follow Up:  - 3 months          Treatment Summary has been discussed and given to patient: n/a        -------------------------------------------------------------------------------------------------------------------  Please call our office at [phone] if you have any  of the following symptoms:   · Fever of 100.5 or greater  · Chills  · Shortness of breath  · Swelling or pain in one leg    After office hours an answering service is available and will contact a provider for emergencies or if you are experiencing any of the above symptoms.  Patient has not express an interest in My Chart. My Chart log in information explained on the after visit summary printout at the [facility] desk.     Aye Hightower RN

## 2022-07-08 ENCOUNTER — OFFICE VISIT (OUTPATIENT)
Dept: ONCOLOGY | Age: 72
End: 2022-07-08
Payer: MEDICARE

## 2022-07-08 VITALS
WEIGHT: 170.8 LBS | TEMPERATURE: 98.1 F | HEART RATE: 88 BPM | OXYGEN SATURATION: 99 % | BODY MASS INDEX: 29.16 KG/M2 | HEIGHT: 64 IN | SYSTOLIC BLOOD PRESSURE: 122 MMHG | RESPIRATION RATE: 16 BRPM | DIASTOLIC BLOOD PRESSURE: 78 MMHG

## 2022-07-08 DIAGNOSIS — I82.4Y9 DEEP VEIN THROMBOSIS (DVT) OF PROXIMAL LOWER EXTREMITY, UNSPECIFIED CHRONICITY, UNSPECIFIED LATERALITY (HCC): Primary | ICD-10-CM

## 2022-07-08 DIAGNOSIS — I82.4Y9 DEEP VEIN THROMBOSIS (DVT) OF PROXIMAL LOWER EXTREMITY, UNSPECIFIED CHRONICITY, UNSPECIFIED LATERALITY (HCC): ICD-10-CM

## 2022-07-08 LAB
ALBUMIN SERPL-MCNC: 3.8 G/DL (ref 3.2–4.6)
ALBUMIN/GLOB SERPL: 1.2 {RATIO} (ref 1.2–3.5)
ALP SERPL-CCNC: 85 U/L (ref 50–136)
ALT SERPL-CCNC: 26 U/L (ref 12–65)
ANION GAP SERPL CALC-SCNC: 7 MMOL/L (ref 7–16)
AST SERPL-CCNC: 24 U/L (ref 15–37)
BASOPHILS # BLD: 0 K/UL (ref 0–0.2)
BASOPHILS NFR BLD: 1 % (ref 0–2)
BILIRUB SERPL-MCNC: 0.7 MG/DL (ref 0.2–1.1)
BUN SERPL-MCNC: 13 MG/DL (ref 8–23)
CALCIUM SERPL-MCNC: 9.1 MG/DL (ref 8.3–10.4)
CHLORIDE SERPL-SCNC: 105 MMOL/L (ref 98–107)
CO2 SERPL-SCNC: 27 MMOL/L (ref 21–32)
CREAT SERPL-MCNC: 1.4 MG/DL (ref 0.6–1)
D DIMER PPP FEU-MCNC: 0.46 UG/ML(FEU)
DIFFERENTIAL METHOD BLD: NORMAL
EOSINOPHIL # BLD: 0.3 K/UL (ref 0–0.8)
EOSINOPHIL NFR BLD: 3 % (ref 0.5–7.8)
ERYTHROCYTE [DISTWIDTH] IN BLOOD BY AUTOMATED COUNT: 12.1 % (ref 11.9–14.6)
GLOBULIN SER CALC-MCNC: 3.3 G/DL (ref 2.3–3.5)
GLUCOSE SERPL-MCNC: 91 MG/DL (ref 65–100)
HCT VFR BLD AUTO: 40.5 % (ref 35.8–46.3)
HGB BLD-MCNC: 13.6 G/DL (ref 11.7–15.4)
IMM GRANULOCYTES # BLD AUTO: 0 K/UL (ref 0–0.5)
IMM GRANULOCYTES NFR BLD AUTO: 0 % (ref 0–5)
LYMPHOCYTES # BLD: 2 K/UL (ref 0.5–4.6)
LYMPHOCYTES NFR BLD: 25 % (ref 13–44)
MCH RBC QN AUTO: 31.4 PG (ref 26.1–32.9)
MCHC RBC AUTO-ENTMCNC: 33.6 G/DL (ref 31.4–35)
MCV RBC AUTO: 93.5 FL (ref 79.6–97.8)
MONOCYTES # BLD: 0.9 K/UL (ref 0.1–1.3)
MONOCYTES NFR BLD: 11 % (ref 4–12)
NEUTS SEG # BLD: 4.9 K/UL (ref 1.7–8.2)
NEUTS SEG NFR BLD: 60 % (ref 43–78)
NRBC # BLD: 0 K/UL (ref 0–0.2)
PLATELET # BLD AUTO: 231 K/UL (ref 150–450)
PMV BLD AUTO: 10.8 FL (ref 9.4–12.3)
POTASSIUM SERPL-SCNC: 4 MMOL/L (ref 3.5–5.1)
PROT SERPL-MCNC: 7.1 G/DL (ref 6.3–8.2)
RBC # BLD AUTO: 4.33 M/UL (ref 4.05–5.2)
SODIUM SERPL-SCNC: 139 MMOL/L (ref 136–145)
WBC # BLD AUTO: 8.2 K/UL (ref 4.3–11.1)

## 2022-07-08 PROCEDURE — G8417 CALC BMI ABV UP PARAM F/U: HCPCS | Performed by: INTERNAL MEDICINE

## 2022-07-08 PROCEDURE — G8428 CUR MEDS NOT DOCUMENT: HCPCS | Performed by: INTERNAL MEDICINE

## 2022-07-08 PROCEDURE — G8400 PT W/DXA NO RESULTS DOC: HCPCS | Performed by: INTERNAL MEDICINE

## 2022-07-08 PROCEDURE — 1036F TOBACCO NON-USER: CPT | Performed by: INTERNAL MEDICINE

## 2022-07-08 PROCEDURE — 1090F PRES/ABSN URINE INCON ASSESS: CPT | Performed by: INTERNAL MEDICINE

## 2022-07-08 PROCEDURE — 99214 OFFICE O/P EST MOD 30 MIN: CPT | Performed by: INTERNAL MEDICINE

## 2022-07-08 PROCEDURE — 3017F COLORECTAL CA SCREEN DOC REV: CPT | Performed by: INTERNAL MEDICINE

## 2022-07-08 PROCEDURE — 1123F ACP DISCUSS/DSCN MKR DOCD: CPT | Performed by: INTERNAL MEDICINE

## 2022-07-08 ASSESSMENT — PATIENT HEALTH QUESTIONNAIRE - PHQ9
SUM OF ALL RESPONSES TO PHQ QUESTIONS 1-9: 0
SUM OF ALL RESPONSES TO PHQ QUESTIONS 1-9: 0
2. FEELING DOWN, DEPRESSED OR HOPELESS: 0
SUM OF ALL RESPONSES TO PHQ QUESTIONS 1-9: 0
SUM OF ALL RESPONSES TO PHQ QUESTIONS 1-9: 0

## 2022-07-08 NOTE — PROGRESS NOTES
[facility] Hematology and Oncology: Office Visit Established Patient    Chief Complaint:    Chief Complaint   Patient presents with    Follow-up         History of Present Illness:  Ms. Rebel Heath is a 81 y.o. female who returns today for management of VTE. She presented to the ED at [facility] on 3/23/22 for left lower extremity edema, she was found to have DVT, a subsequent ED visit on 3/26/22 demonstrated a RLL subsegmental PE. She was hemodynamically stable and she was discharged from the ED with a prescription for Eliquis. She  has no personal or family history of VTE. She was seen by vascular surgery at [facility], they felt that this was most likely caused by recent COVID infection. She is on appropriate anticoagulation and should continue this for at least 3 months (through June 2022). Afterward, she will have repeat D-dimer and ultrasound, if normal with resolution of symptoms, she will be eligible to discontinue therapy as this was likely provoked. She has had some serologies looking for an inherited thrombophilia, unfortunately some of these were likely affected by active thrombus and anticoagulation (especially ATIII and LAC). Therefore, I would plan to repeat these after she completes anticoagulation, and also include full beta-2-GP-I and aCL testing. Here for follow-up. No complaints. She remains on Eliquis with no side effects, no bleeding. She has a dental procedure coming up next week and is planning to hold therapy for the two days prior to the procedure. Review of Systems:  Constitutional: Negative. HENT: Negative. Eyes: Negative. Respiratory: Negative. Cardiovascular: Negative. Gastrointestinal: Negative. Genitourinary: Negative. Musculoskeletal: Negative. Skin: Negative. Neurological: Negative. Endo/Heme/Allergies: Negative. Psychiatric/Behavioral: Negative. All other systems reviewed and are negative.        Allergies   Allergen Reactions    Hydrocodone-Acetaminophen Other (See Comments)    Thimerosal Shortness Of Breath and Other (See Comments)     Localized redness  Other reaction(s): redness/burning    Adhesive Tape Other (See Comments)     localized redness and blisters - paper tape is ok    Codeine Itching     Past Medical History:   Diagnosis Date    Acquired hypothyroidism     Anxiety     Arthritis     Back and extremities    Autoimmune disease (HCC)     Chronic pain     Fibromyalgia    CTS (carpal tunnel syndrome)     Depression     Fibromyalgia     GERD (gastroesophageal reflux disease)     Also has IBS    IBS (irritable bowel syndrome)     Menopause     Other ill-defined conditions(799.89)     Raynaud's and peripheral neuropathy    Psychotic disorder (HCC)     sechizophrinia    Raynaud phenomenon     Sjogren-Belkys syndrome     Sleep apnea     Thyroid disease     Hypothyroid    TMJ (temporomandibular joint syndrome)     Unspecified sleep apnea     uses CPAP - will bring DOS    Varicose veins      Past Surgical History:   Procedure Laterality Date    BREAST SURGERY  1983 - placed 1983    breast implants - removed 2005    CHOLECYSTECTOMY  11/2008    COLONOSCOPY  10/30/2014    Dr. De Leon [facility] in 10 years.  GI      anal sphincter repair x 2     HEENT  1970's    deviated septum repair    US BREAST NEEDLE BIOPSY LEFT Left 11/8/2021    US GUIDE INTACT BREAST BIOPSY LT [facility] CC AMB HISTORICAL     Family History   Problem Relation Age of Onset    Other Neg Hx     Post-op Cognitive Dysfunction Neg Hx     Emergence Delirium Neg Hx     Post-op Nausea/Vomiting Neg Hx     Delayed Awakening Neg Hx     Pseudochol.  Deficiency Neg Hx     Malig Hypertherm Neg Hx     Tuberculosis Other     No Known Problems Mother     No Known Problems Father      Social History     Socioeconomic History    Marital status:      Spouse name: Not on file    Number of children: Not on file    Years of education: Not on file    cephALEXin (KEFLEX) 500 MG capsule       oxybutynin (DITROPAN XL) 5 MG extended release tablet Take 1 tablet by mouth daily 90 tablet 3    apixaban (ELIQUIS) 5 MG TABS tablet 1 p.o. daily 90 tablet 3    pantoprazole (PROTONIX) 40 MG tablet Take 1 tablet by mouth daily TAKE 1 TABLET EVERY DAY 90 tablet 3    citalopram (CELEXA) 40 MG tablet Take 1 tablet by mouth daily 90 tablet 3    diazePAM (VALIUM) 10 MG tablet Take 1 tablet by mouth daily for 30 days. 30 tablet 5    levothyroxine (SYNTHROID) 50 MCG tablet Take 1 tablet by mouth Daily TAKE 1 TABLET BY MOUTH DAILY (BEFORE BREAKFAST) FOR HYPOTHYROIDISM 90 tablet 3    cycloSPORINE (RESTASIS) 0.05 % ophthalmic emulsion       hydroxychloroquine (PLAQUENIL) 200 mg tablet       traMADol (ULTRAM) 50 MG tablet TAKE 1 TABLET BY MOUTH 4 TIMES DAILY AS NEEDED FOR PAIN      triamcinolone (KENALOG) 0.1 % cream MIX WITH ANY MOISTURIZER AND APPLY CREAM EXTERNALLY TO AFFECTED AREA THREE TIMES DAILY      Calcium Carbonate-Vitamin D (OYSTER SHELL CALCIUM/D) 500-200 MG-UNIT TABS Take by mouth      vitamin D3 (CHOLECALCIFEROL) 10 MCG (400 UNIT) TABS tablet Take by mouth      gabapentin (NEURONTIN) 400 MG capsule Take 400 mg by mouth 4 times daily.  loperamide (IMODIUM) 2 MG capsule Take 2 mg by mouth 4 times daily as needed      naloxone 4 MG/0.1ML LIQD nasal spray ADMINISTER A SINGLE SPRAY IN ONE NOSTRIL UPON SIGNS OF OPIOID OVERDOSE. CALL 911. REPEAT AFTER 3 MINUTES IF NO RESPONSE.  risperiDONE (RISPERDAL) 1 MG tablet Take 1 mg by mouth      spironolactone (ALDACTONE) 50 MG tablet Take 1 tablet by mouth 3 times daily       No current facility-administered medications for this visit.        OBJECTIVE:  /78   Pulse 88   Temp 98.1 °F (36.7 °C)   Resp 16   Ht 5' 4\" (1.626 m)   Wt 170 lb 12.8 oz (77.5 kg)   SpO2 99%   BMI 29.32 kg/m²     Physical Exam:  Constitutional: Well developed, well nourished female in no acute distress, sitting comfortably in the exam room chair. HEENT: Normocephalic and atraumatic. Sclerae anicteric. Neck supple without JVD. No thyromegaly present. Lymph node   No palpable submandibular, cervical, supraclavicular lymph nodes. Skin Warm and dry. No bruising and no rash noted. No erythema. No pallor. Respiratory Lungs are clear to auscultation bilaterally without wheezes, rales or rhonchi, normal air exchange without accessory muscle use. CVS Normal rate, regular rhythm and normal S1 and S2. No murmurs, gallops, or rubs. Abdomen Soft, nontender and nondistended, normoactive bowel sounds. No palpable mass. No hepatosplenomegaly. Neuro Grossly nonfocal with no obvious sensory or motor deficits. MSK Normal range of motion in general.  No edema and no tenderness. Psych Appropriate mood and affect.       Labs:  Recent Results (from the past 96 hour(s))   D-Dimer, Quantitative    Collection Time: 07/08/22 11:01 AM   Result Value Ref Range    D-Dimer, Quant 0.46 <0.56 ug/ml(FEU)   CBC with Auto Differential    Collection Time: 07/08/22 11:01 AM   Result Value Ref Range    WBC 8.2 4.3 - 11.1 K/uL    RBC 4.33 4.05 - 5.2 M/uL    Hemoglobin 13.6 11.7 - 15.4 g/dL    Hematocrit 40.5 35.8 - 46.3 %    MCV 93.5 79.6 - 97.8 FL    MCH 31.4 26.1 - 32.9 PG    MCHC 33.6 31.4 - 35.0 g/dL    RDW 12.1 11.9 - 14.6 %    Platelets 673 492 - 709 K/uL    MPV 10.8 9.4 - 12.3 FL    nRBC 0.00 0.0 - 0.2 K/uL    Differential Type AUTOMATED      Seg Neutrophils 60 43 - 78 %    Lymphocytes 25 13 - 44 %    Monocytes 11 4.0 - 12.0 %    Eosinophils % 3 0.5 - 7.8 %    Basophils 1 0.0 - 2.0 %    Immature Granulocytes 0 0.0 - 5.0 %    Segs Absolute 4.9 1.7 - 8.2 K/UL    Absolute Lymph # 2.0 0.5 - 4.6 K/UL    Absolute Mono # 0.9 0.1 - 1.3 K/UL    Absolute Eos # 0.3 0.0 - 0.8 K/UL    Basophils Absolute 0.0 0.0 - 0.2 K/UL    Absolute Immature Granulocyte 0.0 0.0 - 0.5 K/UL   Comprehensive Metabolic Panel    Collection Time: 07/08/22 11:01 AM   Result Value Ref Range    Sodium 139 136 - 145 mmol/L    Potassium 4.0 3.5 - 5.1 mmol/L    Chloride 105 98 - 107 mmol/L    CO2 27 21 - 32 mmol/L    Anion Gap 7 7 - 16 mmol/L    Glucose 91 65 - 100 mg/dL    BUN 13 8 - 23 MG/DL    CREATININE 1.40 (H) 0.6 - 1.0 MG/DL    GFR  48 (L) >60 ml/min/1.73m2    GFR Non- 40 (L) >60 ml/min/1.73m2    Calcium 9.1 8.3 - 10.4 MG/DL    Total Bilirubin 0.7 0.2 - 1.1 MG/DL    ALT 26 12 - 65 U/L    AST 24 15 - 37 U/L    Alk Phosphatase 85 50 - 136 U/L    Total Protein 7.1 6.3 - 8.2 g/dL    Albumin 3.8 3.2 - 4.6 g/dL    Globulin 3.3 2.3 - 3.5 g/dL    Albumin/Globulin Ratio 1.2 1.2 - 3.5         Imaging:  Vascular duplex lower extremity venous left    Result Date: 7/6/2022  1. No evidence for left lower extremity DVT. ASSESSMENT:   Diagnosis Orders   1. Deep vein thrombosis (DVT) of proximal lower extremity, unspecified chronicity, unspecified laterality (HCC)  Cardiolipin and Phosphatidyl AB Panel    Beta-2 Glycoprotein Antibodies    Antithrombin III    Protein C Functional    Protein S Activity    Lupus Anticoagulant Comp Panel         PLAN:  Lab studies were personally reviewed. VTE: with DVT diagnosed March 2022, small RLL subsegmental PE as well, possibly instigated by COVID infection. No personal or family history of VTE. On Eliquis for therapy. I discussed the pathophysiology and natural history of VTE with Ms. Abimael Morgan. She is on appropriate anticoagulation and should continue this for at least  3 months (through June 2022). Afterward, she will have repeat D-dimer and ultrasound, if normal with resolution of symptoms, she will be eligible to discontinue therapy as this was likely provoked. She has had some serologies looking for an inherited thrombophilia, unfortunately some of these were likely affected by active thrombus and anticoagulation (especially ATIII and LAC).   Therefore, I would plan to repeat these after she completes anticoagulation, and also include full beta-2-GP-I and aCL testing. Here for follow-up. No complaints. She remains on Eliquis with no side effects, no bleeding. She has a dental procedure coming up next week and is planning to hold therapy for the two days prior to the procedure. Labs reviewed, D-dimer is normal and ultrasound and is negative in the LEs. Therefore, I am comfortable that her anticoagulation has been sufficient. However, because of the lupus anticoagulant suggested on her original labs, I would recommend testing for LAC as well as direct antibody testing (aCL, phosphatidylserine, beta-2-GP-1). We will complete these after she has been off the Eliquis for a few days for the dental procedure. She can resume Eliquis until we see her for lab review if she wishes. All questions were asked and answered to the best of my ability.            Polina Quiroz MD, MD  [facility] Hematology and Oncology  [address] [place] [address]  Office : [phone]  Fax : [phone]

## 2022-07-13 ENCOUNTER — HOSPITAL ENCOUNTER (OUTPATIENT)
Dept: LAB | Age: 72
Discharge: HOME | End: 2022-07-16
Payer: MEDICARE

## 2022-07-13 DIAGNOSIS — I82.4Y9 DEEP VEIN THROMBOSIS (DVT) OF PROXIMAL LOWER EXTREMITY, UNSPECIFIED CHRONICITY, UNSPECIFIED LATERALITY (HCC): ICD-10-CM

## 2022-07-13 PROCEDURE — 85303 CLOT INHIBIT PROT C ACTIVITY: CPT

## 2022-07-13 PROCEDURE — 86148 ANTI-PHOSPHOLIPID ANTIBODY: CPT

## 2022-07-13 PROCEDURE — 85732 THROMBOPLASTIN TIME PARTIAL: CPT

## 2022-07-13 PROCEDURE — 85306 CLOT INHIBIT PROT S FREE: CPT

## 2022-07-13 PROCEDURE — 36415 COLL VENOUS BLD VENIPUNCTURE: CPT

## 2022-07-13 PROCEDURE — 86146 BETA-2 GLYCOPROTEIN ANTIBODY: CPT

## 2022-07-15 LAB — PROT C PPP-ACNC: NORMAL %

## 2022-07-18 LAB
B2 GLYCOPROT1 IGA SER-ACNC: <9 GPI IGA UNITS (ref 0–25)
B2 GLYCOPROT1 IGG SER-ACNC: <9 GPI IGG UNITS (ref 0–20)
B2 GLYCOPROT1 IGM SER-ACNC: <9 GPI IGM UNITS (ref 0–32)
CARDIOLIPIN IGA SER IA-ACNC: <9 APL U/ML (ref 0–11)
CARDIOLIPIN IGG SER IA-ACNC: <9 GPL U/ML (ref 0–14)
CARDIOLIPIN IGM SER IA-ACNC: 16 MPL U/ML (ref 0–12)
CONFIRM APTT/NORMAL: NORMAL SEC
P ETHANOLAMINE AB, IGA: ABNORMAL U/ML
P ETHANOLAMINE AB, IGG: ABNORMAL U/ML
P ETHANOLAMINE AB, IGM: ABNORMAL U/ML
P GLYCEROL IGA: ABNORMAL U/ML
P GLYCEROL IGG: ABNORMAL U/ML
P GLYCEROL IGM: ABNORMAL U/ML
P INOSITOL IGA: ABNORMAL U/ML
P INOSITOL IGG: ABNORMAL U/ML
P INOSITOL IGM: ABNORMAL U/ML
PROT S ACT/NOR PPP: NORMAL %
PS IGA SER-ACNC: 1 APS UNITS (ref 0–19)
PS IGG SER-ACNC: <9 UNITS (ref 0–30)
PS IGM SER-ACNC: <10 UNITS (ref 0–30)
SCREEN APTT: NORMAL SEC
SCREEN DRVVT: NORMAL SEC
THROMBIN TIME: NORMAL SEC

## 2022-07-20 DIAGNOSIS — I82.4Y9 DEEP VEIN THROMBOSIS (DVT) OF PROXIMAL LOWER EXTREMITY, UNSPECIFIED CHRONICITY, UNSPECIFIED LATERALITY (HCC): Primary | ICD-10-CM

## 2022-07-22 ENCOUNTER — HOSPITAL ENCOUNTER (OUTPATIENT)
Dept: LAB | Age: 72
Discharge: HOME | End: 2022-07-25
Payer: MEDICARE

## 2022-07-22 DIAGNOSIS — I82.4Y9 DEEP VEIN THROMBOSIS (DVT) OF PROXIMAL LOWER EXTREMITY, UNSPECIFIED CHRONICITY, UNSPECIFIED LATERALITY (HCC): ICD-10-CM

## 2022-07-22 PROCEDURE — 85732 THROMBOPLASTIN TIME PARTIAL: CPT

## 2022-07-22 PROCEDURE — 85300 ANTITHROMBIN III ACTIVITY: CPT

## 2022-07-22 PROCEDURE — 36415 COLL VENOUS BLD VENIPUNCTURE: CPT

## 2022-07-22 PROCEDURE — 85306 CLOT INHIBIT PROT S FREE: CPT

## 2022-07-22 PROCEDURE — 86148 ANTI-PHOSPHOLIPID ANTIBODY: CPT

## 2022-07-22 PROCEDURE — 85303 CLOT INHIBIT PROT C ACTIVITY: CPT

## 2022-07-23 LAB
APTT SCREEN TO CONFIRM RATIO: 1.03 RATIO (ref 0–1.34)
AT III PPP CHRO-ACNC: 106 % (ref 75–135)
CONFIRM APTT/NORMAL: 37.1 SEC (ref 0–47.6)
LA 2 SCREEN W REFLEX-IMP: NORMAL
PROT C PPP-ACNC: 144 % (ref 73–180)
PROT S ACT/NOR PPP: 106 % (ref 63–140)
SCREEN APTT: 36.7 SEC (ref 0–51.9)
SCREEN DRVVT: 41.6 SEC (ref 0–47)
THROMBIN TIME: 18.6 SEC (ref 0–23)

## 2022-07-25 ENCOUNTER — TELEPHONE (OUTPATIENT)
Dept: FAMILY MEDICINE CLINIC | Facility: CLINIC | Age: 72
End: 2022-07-25

## 2022-07-25 DIAGNOSIS — N39.3 STRESS INCONTINENCE OF URINE: ICD-10-CM

## 2022-07-25 RX ORDER — OXYBUTYNIN CHLORIDE 10 MG/1
10 TABLET, EXTENDED RELEASE ORAL DAILY
Qty: 90 TABLET | Refills: 3 | Status: SHIPPED | OUTPATIENT
Start: 2022-07-25 | End: 2022-10-07

## 2022-07-25 NOTE — TELEPHONE ENCOUNTER
Patient wants to be treated over the phone by increasing his Ditropan because of increased urinary problems patient wants to increase her Ditropan to see if it helps recommended increasing it and follow-up in the appointment

## 2022-07-28 ENCOUNTER — PREP FOR PROCEDURE (OUTPATIENT)
Dept: SURGERY | Age: 72
End: 2022-07-28

## 2022-07-28 PROBLEM — Z12.11 SPECIAL SCREENING FOR MALIGNANT NEOPLASMS, COLON: Status: ACTIVE | Noted: 2022-07-28

## 2022-07-29 ENCOUNTER — TELEPHONE (OUTPATIENT)
Dept: FAMILY MEDICINE CLINIC | Facility: CLINIC | Age: 72
End: 2022-07-29

## 2022-07-29 LAB
CARDIOLIPIN IGA SER IA-ACNC: <9 APL U/ML (ref 0–11)
CARDIOLIPIN IGG SER IA-ACNC: <9 GPL U/ML (ref 0–14)
CARDIOLIPIN IGM SER IA-ACNC: 11 MPL U/ML (ref 0–12)
P ETHANOLAMINE AB, IGA: 2 U/ML
P ETHANOLAMINE AB, IGG: 1 U/ML
P ETHANOLAMINE AB, IGM: 3.9 U/ML
P GLYCEROL IGA: 5.2 U/ML
P GLYCEROL IGG: 2.8 U/ML
P GLYCEROL IGM: 1.6 U/ML
P INOSITOL IGA: 2.2 U/ML
P INOSITOL IGG: 2.7 U/ML
P INOSITOL IGM: 6.3 U/ML
PS IGA SER-ACNC: 1 APS UNITS (ref 0–19)
PS IGG SER-ACNC: 9 UNITS (ref 0–30)
PS IGM SER-ACNC: <10 UNITS (ref 0–30)

## 2022-08-02 DIAGNOSIS — Z12.11 COLON CANCER SCREENING: Primary | ICD-10-CM

## 2022-08-02 RX ORDER — SOD SULF/POT CHLORIDE/MAG SULF 1.479 G
1 TABLET ORAL 2 TIMES DAILY
Qty: 24 TABLET | Refills: 0 | Status: SHIPPED | OUTPATIENT
Start: 2022-09-21

## 2022-08-27 PROBLEM — Z12.11 SPECIAL SCREENING FOR MALIGNANT NEOPLASMS, COLON: Status: RESOLVED | Noted: 2022-07-28 | Resolved: 2022-08-27

## 2022-09-06 ENCOUNTER — TELEPHONE (OUTPATIENT)
Dept: SURGERY | Age: 72
End: 2022-09-06

## 2022-09-12 PROBLEM — Z12.11 SPECIAL SCREENING FOR MALIGNANT NEOPLASMS, COLON: Status: ACTIVE | Noted: 2022-07-28

## 2022-09-13 ENCOUNTER — OFFICE VISIT (OUTPATIENT)
Dept: FAMILY MEDICINE CLINIC | Facility: CLINIC | Age: 72
End: 2022-09-13
Payer: MEDICARE

## 2022-09-13 VITALS
WEIGHT: 169 LBS | HEIGHT: 64 IN | BODY MASS INDEX: 28.85 KG/M2 | DIASTOLIC BLOOD PRESSURE: 76 MMHG | SYSTOLIC BLOOD PRESSURE: 118 MMHG

## 2022-09-13 DIAGNOSIS — N39.46 MIXED STRESS AND URGE URINARY INCONTINENCE: ICD-10-CM

## 2022-09-13 DIAGNOSIS — R30.0 DYSURIA: Primary | ICD-10-CM

## 2022-09-13 LAB
BILIRUBIN, URINE, POC: NEGATIVE
BLOOD URINE, POC: NEGATIVE
GLUCOSE URINE, POC: NEGATIVE
KETONES, URINE, POC: NEGATIVE
LEUKOCYTE ESTERASE, URINE, POC: NEGATIVE
NITRITE, URINE, POC: NEGATIVE
PH, URINE, POC: 6 (ref 4.6–8)
PROTEIN,URINE, POC: NEGATIVE
SPECIFIC GRAVITY, URINE, POC: 1 (ref 1–1.03)
URINALYSIS CLARITY, POC: CLEAR
URINALYSIS COLOR, POC: YELLOW
UROBILINOGEN, POC: NORMAL

## 2022-09-13 PROCEDURE — 1090F PRES/ABSN URINE INCON ASSESS: CPT | Performed by: FAMILY MEDICINE

## 2022-09-13 PROCEDURE — G8400 PT W/DXA NO RESULTS DOC: HCPCS | Performed by: FAMILY MEDICINE

## 2022-09-13 PROCEDURE — 99213 OFFICE O/P EST LOW 20 MIN: CPT | Performed by: FAMILY MEDICINE

## 2022-09-13 PROCEDURE — 3017F COLORECTAL CA SCREEN DOC REV: CPT | Performed by: FAMILY MEDICINE

## 2022-09-13 PROCEDURE — 1036F TOBACCO NON-USER: CPT | Performed by: FAMILY MEDICINE

## 2022-09-13 PROCEDURE — G8417 CALC BMI ABV UP PARAM F/U: HCPCS | Performed by: FAMILY MEDICINE

## 2022-09-13 PROCEDURE — 1123F ACP DISCUSS/DSCN MKR DOCD: CPT | Performed by: FAMILY MEDICINE

## 2022-09-13 PROCEDURE — G8428 CUR MEDS NOT DOCUMENT: HCPCS | Performed by: FAMILY MEDICINE

## 2022-09-13 PROCEDURE — 81003 URINALYSIS AUTO W/O SCOPE: CPT | Performed by: FAMILY MEDICINE

## 2022-09-13 PROCEDURE — 0509F URINE INCON PLAN DOCD: CPT | Performed by: FAMILY MEDICINE

## 2022-09-13 RX ORDER — DIAZEPAM 10 MG/1
TABLET ORAL
COMMUNITY
Start: 2022-08-16

## 2022-09-13 ASSESSMENT — ENCOUNTER SYMPTOMS
NAUSEA: 0
VOMITING: 0
SHORTNESS OF BREATH: 0

## 2022-09-13 ASSESSMENT — PATIENT HEALTH QUESTIONNAIRE - PHQ9: DEPRESSION UNABLE TO ASSESS: FUNCTIONAL CAPACITY MOTIVATION LIMITS ACCURACY

## 2022-09-13 NOTE — PROGRESS NOTES
PROGRESS NOTE    SUBJECTIVE:   Dalia Jerry is a 81 y.o. female seen for a follow up visit regarding the following chief complaint:     Chief Complaint   Patient presents with    Urinary Frequency     Patient asking to increase Oxybutynin 10 mg to 15 mg           HPI patient states that she continues to have problems with mixed urinary incontinence presently on oxybutynin 10 mg wants to know if she can go to 15 mg      Past Medical History, Past Surgical History, Family history, Social History, and Medications were all reviewed with the patient today and updated as necessary. Current Outpatient Medications   Medication Sig Dispense Refill    diazePAM (VALIUM) 10 MG tablet TAKE 1 TABLET BY MOUTH TWICE DAILY      mirabegron (MYRBETRIQ) 25 MG TB24 Take 1 tablet by mouth daily 30 tablet 1    [START ON 9/21/2022] Sodium Sulfate-Mag Sulfate-KCl (SUTAB) [phone] MG TABS Take 1 box by mouth in the morning and at bedtime Follow the directions given by the office only. 24 tablet 0    oxybutynin (DITROPAN XL) 10 MG extended release tablet Take 1 tablet by mouth in the morning.  90 tablet 3    apixaban (ELIQUIS) 5 MG TABS tablet 1 p.o. daily 90 tablet 3    pantoprazole (PROTONIX) 40 MG tablet Take 1 tablet by mouth daily TAKE 1 TABLET EVERY DAY 90 tablet 3    citalopram (CELEXA) 40 MG tablet Take 1 tablet by mouth daily 90 tablet 3    levothyroxine (SYNTHROID) 50 MCG tablet Take 1 tablet by mouth Daily TAKE 1 TABLET BY MOUTH DAILY (BEFORE BREAKFAST) FOR HYPOTHYROIDISM 90 tablet 3    cycloSPORINE (RESTASIS) 0.05 % ophthalmic emulsion       hydroxychloroquine (PLAQUENIL) 200 mg tablet       traMADol (ULTRAM) 50 MG tablet TAKE 1 TABLET BY MOUTH 4 TIMES DAILY AS NEEDED FOR PAIN      triamcinolone (KENALOG) 0.1 % cream MIX WITH ANY MOISTURIZER AND APPLY CREAM EXTERNALLY TO AFFECTED AREA THREE TIMES DAILY      Calcium Carbonate-Vitamin D (OYSTER SHELL CALCIUM/D) 500-200 MG-UNIT TABS Take by mouth      vitamin D3 (CHOLECALCIFEROL) 10 MCG (400 UNIT) TABS tablet Take by mouth      gabapentin (NEURONTIN) 400 MG capsule Take 400 mg by mouth 4 times daily. loperamide (IMODIUM) 2 MG capsule Take 2 mg by mouth 4 times daily as needed      naloxone 4 MG/0.1ML LIQD nasal spray ADMINISTER A SINGLE SPRAY IN ONE NOSTRIL UPON SIGNS OF OPIOID OVERDOSE. CALL 911. REPEAT AFTER 3 MINUTES IF NO RESPONSE.      risperiDONE (RISPERDAL) 1 MG tablet Take 1 mg by mouth      spironolactone (ALDACTONE) 50 MG tablet Take 1 tablet by mouth 3 times daily       No current facility-administered medications for this visit.      Allergies   Allergen Reactions    Hydrocodone-Acetaminophen Other (See Comments)    Thimerosal Shortness Of Breath and Other (See Comments)     Localized redness  Other reaction(s): redness/burning    Adhesive Tape Other (See Comments)     localized redness and blisters - paper tape is ok    Codeine Itching     Patient Active Problem List   Diagnosis    Chronic pain    Autoimmune disease ([facility] [address]    GERD (gastroesophageal reflux disease)    CTS (carpal tunnel syndrome)    Anxiety    Acquired hypothyroidism    Depression    TMJ (temporomandibular joint syndrome)    Fibromyalgia    Raynaud phenomenon    Mild depression (HCC)    Sleep apnea    Psychotic disorder (HCC)    IBS (irritable bowel syndrome)    Arthritis    Sjogren-Belkys syndrome    Chronic renal disease, stage III ([facility]) [784499]    Special screening for malignant neoplasms, colon     Past Medical History:   Diagnosis Date    Acquired hypothyroidism     Anxiety     Arthritis     Back and extremities    Autoimmune disease (HCC)     Chronic pain     Fibromyalgia    CTS (carpal tunnel syndrome)     Depression     Fibromyalgia     GERD (gastroesophageal reflux disease)     Also has IBS    IBS (irritable bowel syndrome)     Menopause     Other ill-defined conditions(125.48)     Raynaud's and peripheral neuropathy    Psychotic disorder (HCC)     sechizophrinia    Raynaud phenomenon     Sjogren-Belkys syndrome     Sleep apnea     Thyroid disease     Hypothyroid    TMJ (temporomandibular joint syndrome)     Unspecified sleep apnea     uses CPAP - will bring DOS    Varicose veins      Past Surgical History:   Procedure Laterality Date    BREAST SURGERY   - placed     breast implants - removed     CHOLECYSTECTOMY  2008    COLONOSCOPY  10/30/2014    Dr. Albino Horne in 10 years. GI      anal sphincter repair x 2     HEENT  's    deviated septum repair    US BREAST NEEDLE BIOPSY LEFT Left 2021    US GUIDE INTACT BREAST BIOPSY [facility] CC AMB HISTORICAL     Family History   Problem Relation Age of Onset    Other Neg Hx     Post-op Cognitive Dysfunction Neg Hx     Emergence Delirium Neg Hx     Post-op Nausea/Vomiting Neg Hx     Delayed Awakening Neg Hx     Pseudochol. Deficiency Neg Hx     Malig Hypertherm Neg Hx     Tuberculosis Other     No Known Problems Mother     No Known Problems Father      Social History     Tobacco Use    Smoking status: Former     Packs/day: 0.05     Years: 7.00     Pack years: 0.35     Types: Cigarettes     Start date: 2001     Quit date: 2008     Years since quittin.9    Smokeless tobacco: Never    Tobacco comments:     Quit smoking: currently quitting   Substance Use Topics    Alcohol use: No     Alcohol/week: 0.0 standard drinks         Review of Systems   Constitutional:  Negative for chills and fever. Respiratory:  Negative for shortness of breath. Cardiovascular:  Negative for chest pain. Gastrointestinal:  Negative for nausea and vomiting. Genitourinary:  Positive for dysuria, frequency and urgency. Negative for difficulty urinating. OBJECTIVE:  /76   Ht 5' 4\" (1.626 m)   Wt 169 lb (76.7 kg)   BMI 29.01 kg/m²      Physical Exam  Vitals and nursing note reviewed. Constitutional:       Appearance: Normal appearance. Eyes:      Pupils: Pupils are equal, round, and reactive to light. Cardiovascular:      Rate and Rhythm: Normal rate and regular rhythm. Pulmonary:      Effort: Pulmonary effort is normal.      Breath sounds: Normal breath sounds. Neurological:      Mental Status: She is alert. Psychiatric:         Mood and Affect: Mood normal.         Behavior: Behavior normal.        Medical problems and test results were reviewed with the patient today. Recent Results (from the past 672 hour(s))   AMB POC URINALYSIS DIP STICK AUTO W/O MICRO    Collection Time: 09/13/22  2:17 PM   Result Value Ref Range    Color, Urine, POC yellow     Clarity, Urine, POC clear     Glucose, Urine, POC Negative Negative    Bilirubin, Urine, POC Negative Negative    Ketones, Urine, POC Negative Negative    Specific Gravity, Urine, POC 1.005 1.001 - 1.035    Blood, Urine, POC Negative Negative    pH, Urine, POC 6.0 4.6 - 8.0    Protein, Urine, POC Negative Negative    Urobilinogen, POC Normal     Nitrate, Urine, POC Negative Negative    Leukocyte Esterase, Urine, POC Negative Negative       ASSESSMENT and PLAN    Visit Diagnoses and Associated Orders       Dysuria    -  Primary    AMB POC URINALYSIS DIP STICK AUTO W/O MICRO [36745 CPT(R)]           Mixed stress and urge urinary incontinence        mirabegron (MYRBETRIQ) 25 MG TB24 [804454]           ORDERS WITHOUT AN ASSOCIATED DIAGNOSIS    diazePAM (VALIUM) 10 MG tablet [2403]                  Diagnosis Orders   1. Dysuria  AMB POC URINALYSIS DIP STICK AUTO W/O MICRO      2. Mixed stress and urge urinary incontinence  mirabegron (MYRBETRIQ) 25 MG TB24      , Omayra Garcia was seen today for urinary frequency.     Diagnoses and all orders for this visit:    Dysuria  -     AMB POC URINALYSIS DIP STICK AUTO W/O MICRO    Mixed stress and urge urinary incontinence  -     mirabegron (MYRBETRIQ) 25 MG TB24; Take 1 tablet by mouth daily    , After long lengthy discussion about urge incontinence and options we will start her on Myrbetriq in combination with oxybutynin possibly discontinuing oxybutynin depending on how she responds or tolerates possibility of Chalmer Gilford above is also an option when the other option is talking to one of the urogynecologist for further evaluation and treatment we will wait and see how she does we will touch base with her follow-up in a couple weeks via phone call or office visit

## 2022-10-04 ENCOUNTER — ANESTHESIA EVENT (OUTPATIENT)
Dept: ENDOSCOPY | Age: 72
End: 2022-10-04
Payer: MEDICARE

## 2022-10-04 NOTE — PERIOP NOTE
Patient verified name, , and procedure. Type: 1a; abbreviated assessment per anesthesia guidelines    Labs per anesthesia: none    Instructed pt that they will be notified the day before their procedure by the GI Lab for time of arrival if their procedure is [address] and Pre-op for [place] cases. Arrival times should be called by 5 pm. If no phone is received the patient should contact their respective hospital. The GI lab telephone number is [phone] and ES Pre-op is [phone]. Follow diet and prep instructions per office including NPO status. If patient has NOT received instructions from office patient is advised to call surgeon office, verbalizes understanding. Bath or shower the night before and the am of surgery with non-mositurizing soap. No lotions, oils, powders, cologne on skin. No make up, eye make up or jewelry. Wear loose fitting comfortable, clean clothing. Must have adult present in building the entire time . Medications for the day of procedure Tramadol PRN, Valium, Gabapentin, hydroxychloroquine, levothyroxine, eye gtts, protonix, myrbetriq, patient to hold vitamins, supplements, herbals, NSAIDs, starting now. The following discharge instructions reviewed with patient: medication given during procedure may cause drowsiness for several hours, therefore, do not drive or operate machinery for remainder of the day. You may not drink alcohol on the day of your procedure, please resume regular diet and activity unless otherwise directed. You may experience abdominal distention for several hours that is relieved by the passage of gas. Contact your physician if you have any of the following: fever or chills, severe abdominal pain or excessive amount of bleeding or a large amount when having a bowel movement.  Occasional specks of blood with bowel movement would not be unusual.

## 2022-10-05 ENCOUNTER — ANESTHESIA (OUTPATIENT)
Dept: ENDOSCOPY | Age: 72
End: 2022-10-05
Payer: MEDICARE

## 2022-10-05 ENCOUNTER — HOSPITAL ENCOUNTER (OUTPATIENT)
Age: 72
Setting detail: OUTPATIENT SURGERY
Discharge: HOME | End: 2022-10-05
Attending: SURGERY | Admitting: SURGERY
Payer: MEDICARE

## 2022-10-05 VITALS
BODY MASS INDEX: 29.08 KG/M2 | HEIGHT: 64 IN | RESPIRATION RATE: 16 BRPM | DIASTOLIC BLOOD PRESSURE: 60 MMHG | TEMPERATURE: 97.8 F | SYSTOLIC BLOOD PRESSURE: 107 MMHG | HEART RATE: 73 BPM | WEIGHT: 170.3 LBS | OXYGEN SATURATION: 97 %

## 2022-10-05 DIAGNOSIS — Z12.11 SPECIAL SCREENING FOR MALIGNANT NEOPLASMS, COLON: ICD-10-CM

## 2022-10-05 PROBLEM — D12.5 BENIGN NEOPLASM OF SIGMOID COLON: Status: ACTIVE | Noted: 2022-10-05

## 2022-10-05 PROBLEM — D12.0 BENIGN NEOPLASM OF CECUM: Status: ACTIVE | Noted: 2022-10-05

## 2022-10-05 PROCEDURE — 45384 COLONOSCOPY W/LESION REMOVAL: CPT | Performed by: SURGERY

## 2022-10-05 PROCEDURE — 2580000003 HC RX 258: Performed by: ANESTHESIOLOGY

## 2022-10-05 PROCEDURE — 88305 TISSUE EXAM BY PATHOLOGIST: CPT

## 2022-10-05 PROCEDURE — 3700000001 HC ADD 15 MINUTES (ANESTHESIA): Performed by: SURGERY

## 2022-10-05 PROCEDURE — 3700000000 HC ANESTHESIA ATTENDED CARE: Performed by: SURGERY

## 2022-10-05 PROCEDURE — 2580000003 HC RX 258: Performed by: NURSE ANESTHETIST, CERTIFIED REGISTERED

## 2022-10-05 PROCEDURE — 7100000010 HC PHASE II RECOVERY - FIRST 15 MIN: Performed by: SURGERY

## 2022-10-05 PROCEDURE — 7100000011 HC PHASE II RECOVERY - ADDTL 15 MIN: Performed by: SURGERY

## 2022-10-05 PROCEDURE — 6360000002 HC RX W HCPCS: Performed by: NURSE ANESTHETIST, CERTIFIED REGISTERED

## 2022-10-05 PROCEDURE — 3609010400 HC COLONOSCOPY POLYPECTOMY HOT BIOPSY: Performed by: SURGERY

## 2022-10-05 PROCEDURE — 2709999900 HC NON-CHARGEABLE SUPPLY: Performed by: SURGERY

## 2022-10-05 PROCEDURE — 2500000003 HC RX 250 WO HCPCS: Performed by: NURSE ANESTHETIST, CERTIFIED REGISTERED

## 2022-10-05 PROCEDURE — 45385 COLONOSCOPY W/LESION REMOVAL: CPT | Performed by: SURGERY

## 2022-10-05 RX ORDER — SODIUM CHLORIDE, SODIUM LACTATE, POTASSIUM CHLORIDE, CALCIUM CHLORIDE 600; 310; 30; 20 MG/100ML; MG/100ML; MG/100ML; MG/100ML
INJECTION, SOLUTION INTRAVENOUS CONTINUOUS PRN
Status: DISCONTINUED | OUTPATIENT
Start: 2022-10-05 | End: 2022-10-05 | Stop reason: SDUPTHER

## 2022-10-05 RX ORDER — LIDOCAINE HYDROCHLORIDE 20 MG/ML
INJECTION, SOLUTION EPIDURAL; INFILTRATION; INTRACAUDAL; PERINEURAL PRN
Status: DISCONTINUED | OUTPATIENT
Start: 2022-10-05 | End: 2022-10-05 | Stop reason: SDUPTHER

## 2022-10-05 RX ORDER — PROPOFOL 10 MG/ML
INJECTION, EMULSION INTRAVENOUS CONTINUOUS PRN
Status: DISCONTINUED | OUTPATIENT
Start: 2022-10-05 | End: 2022-10-05 | Stop reason: SDUPTHER

## 2022-10-05 RX ORDER — SODIUM CHLORIDE, SODIUM LACTATE, POTASSIUM CHLORIDE, CALCIUM CHLORIDE 600; 310; 30; 20 MG/100ML; MG/100ML; MG/100ML; MG/100ML
INJECTION, SOLUTION INTRAVENOUS CONTINUOUS
Status: DISCONTINUED | OUTPATIENT
Start: 2022-10-05 | End: 2022-10-05 | Stop reason: HOSPADM

## 2022-10-05 RX ORDER — LIDOCAINE HYDROCHLORIDE 10 MG/ML
1 INJECTION, SOLUTION INFILTRATION; PERINEURAL
Status: DISCONTINUED | OUTPATIENT
Start: 2022-10-05 | End: 2022-10-05 | Stop reason: HOSPADM

## 2022-10-05 RX ORDER — PHENYLEPHRINE HYDROCHLORIDE 10 MG/ML
INJECTION INTRAVENOUS PRN
Status: DISCONTINUED | OUTPATIENT
Start: 2022-10-05 | End: 2022-10-05 | Stop reason: SDUPTHER

## 2022-10-05 RX ORDER — PROPOFOL 10 MG/ML
INJECTION, EMULSION INTRAVENOUS PRN
Status: DISCONTINUED | OUTPATIENT
Start: 2022-10-05 | End: 2022-10-05

## 2022-10-05 RX ADMIN — SODIUM CHLORIDE, POTASSIUM CHLORIDE, SODIUM LACTATE AND CALCIUM CHLORIDE: 600; 310; 30; 20 INJECTION, SOLUTION INTRAVENOUS at 07:29

## 2022-10-05 RX ADMIN — Medication 100 MG: at 08:40

## 2022-10-05 RX ADMIN — SODIUM CHLORIDE, SODIUM LACTATE, POTASSIUM CHLORIDE, AND CALCIUM CHLORIDE: 600; 310; 30; 20 INJECTION, SOLUTION INTRAVENOUS at 08:36

## 2022-10-05 RX ADMIN — PHENYLEPHRINE HYDROCHLORIDE 150 MCG: 10 INJECTION INTRAVENOUS at 09:08

## 2022-10-05 RX ADMIN — PROPOFOL 120 MCG/KG/MIN: 10 INJECTION, EMULSION INTRAVENOUS at 08:40

## 2022-10-05 ASSESSMENT — PAIN - FUNCTIONAL ASSESSMENT
PAIN_FUNCTIONAL_ASSESSMENT: NONE - DENIES PAIN
PAIN_FUNCTIONAL_ASSESSMENT: NONE - DENIES PAIN

## 2022-10-05 NOTE — OP NOTE
Operative Report    Patient: Jose Gonsalez MRN: [id number]      YOB: 1950  Age: 81 y.o. Sex: female            Preoperative Diagnosis: screening for colon cancer    Postoperative Diagnosis: polyps, redundant colon    Procedure:  [id number] with removal lesion, snare and [id number] with removal lesion, hot forceps    Anesthesia: CYNTHIA-per anesthesia    Indications:  As outlined in the History and Physical.      Procedure in Detail:  Informed consent was obtained for the procedure. The patient was placed in the left lateral decubitus position and sedation was induced by anesthesia. The scope was inserted into the rectum and advanced under direct vision to the cecum, which was identified by the ileocecal valve and appendiceal orifice. The quality of the colonic preparation was adequate. A careful inspection was made as the colonoscope was withdrawn, including a retroflexed view of the rectum; findings and interventions are described below. Appropriate photodocumentation was obtained. Findings:     Anus:  Normal  Rectum:       -Protruding lesions:     -Pedunculated Polyp(s) size 4-5 mm removed by polypectomy (snare cautery)  Sigmoid:   Normal      -redundant and tortuous  Descending Colon:   Normal  Transverse Colon:   Normal  Ascending Colon:   Normal  Cecum:       -Protruding lesions:     -Sessile Polyp(s) size 3 mm removed by polypectomy (hot biopsy)  Terminal Ileum: Not entered    Specimens: Specimens were collected and sent to pathology. Complications: None; patient tolerated the procedure well. \    EBL -insignificant    Recommendations:   - Await pathology.     - RESUME ELIQUIS Monday 10/10      Signed By:  Emily Gold MD     October 5, 2022

## 2022-10-05 NOTE — DISCHARGE INSTRUCTIONS
Gastrointestinal Colonoscopy/Flexible Sigmoidoscopy - Lower Exam Discharge Instructions    Call Dr. Raphael Ramesh for any problems or questions. Contact the doctors office for follow up appointment as directed    Medication may cause drowsiness for several hours, therefore:  Do not drive or operate machinery for reminder of the day. No alcohol today. Do not make any important or legal decisions for 24 hours. Do not sign any legal documents for 24 hours. Ordinarily, you may resume regular diet and activity after exam unless otherwise specified by your physician. Because of air put into your colon during exam, you may experience some abdominal distension, relieved by the passage of gas, for several hours. Contact your physician if you have any of the following:  Excessive amount of bleeding - large amount when having a bowel movement. Occasional specks of blood with bowel movement would not be unusual.  Severe abdominal pain  Fever or Chills      Any additional instructions: The office will call you in 1-2 weeks with the pathology results    Restart your Eliquis on Monday       Instructions given to Perfecto Reveles and other family members.   Instructions given by:  Macy Ochoa RN

## 2022-10-05 NOTE — H&P
History and Physical      Patient: Jason Bui    Physician: Eliezer Chavira MD    Referring Physician: Charbel Mitchell DO    Chief Complaint: For colonoscopy    History of Present Illness: Pt presents for colonoscopy. Prior neg exam.    History:  Past Medical History:   Diagnosis Date    Acquired hypothyroidism     Acute deep vein thrombosis (DVT) of femoral vein of left lower extremity (HCC)     on Eliquis, secondary to COVID infection 2022    Anxiety     Arthritis     Back and extremities    Autoimmune disease (HCC)     Chronic pain     Fibromyalgia    CTS (carpal tunnel syndrome)     Depression     Fibromyalgia     GERD (gastroesophageal reflux disease)     Also has IBS    IBS (irritable bowel syndrome)     Menopause     NIKOLAS on CPAP     Other ill-defined conditions(799.89)     Raynaud's and peripheral neuropathy    Psychotic disorder ([facility] [address]     sechizophrinia    Raynaud phenomenon     Single subsegmental pulmonary embolism without acute cor pulmonale (HCC)     Sjogren-Belkys syndrome     Sleep apnea     Thyroid disease     Hypothyroid    TMJ (temporomandibular joint syndrome)     Varicose veins      Past Surgical History:   Procedure Laterality Date    BREAST SURGERY   - placed     breast implants - removed     CHOLECYSTECTOMY  2008    COLONOSCOPY  10/30/2014    Dr. Bere Duron in 10 years.     GI      anal sphincter repair x 2     HEENT  's    deviated septum repair    US BREAST NEEDLE BIOPSY LEFT Left 2021    [facility] AMB HISTORICAL      Social History     Socioeconomic History    Marital status:      Spouse name: None    Number of children: None    Years of education: None    Highest education level: None   Tobacco Use    Smoking status: Former     Packs/day: 0.05     Years: 7.00     Pack years: 0.35     Types: Cigarettes     Start date: 2001     Quit date: 2008     Years since quittin.0    Smokeless tobacco: Never Tobacco comments:     Quit smoking: currently quitting   Vaping Use    Vaping Use: Never used   Substance and Sexual Activity    Alcohol use: No     Alcohol/week: 0.0 standard drinks    Drug use: No      Family History   Problem Relation Age of Onset    Other Neg Hx     Post-op Cognitive Dysfunction Neg Hx     Emergence Delirium Neg Hx     Post-op Nausea/Vomiting Neg Hx     Delayed Awakening Neg Hx     Pseudochol. Deficiency Neg Hx     Malig Hypertherm Neg Hx     Tuberculosis Other     No Known Problems Mother     No Known Problems Father        Medications:   Prior to Admission medications    Medication Sig Start Date End Date Taking? Authorizing Provider   diazePAM (VALIUM) 10 MG tablet TAKE 1 TABLET BY MOUTH TWICE DAILY 8/16/22   Historical Provider, MD   mirabegron (MYRBETRIQ) 25 MG TB24 Take 1 tablet by mouth daily  Patient not taking: No sig reported 9/13/22   Genaro Alves DO   Sodium Sulfate-Mag Sulfate-KCl (SUTAB) 7954-087-064 MG TABS Take 1 box by mouth in the morning and at bedtime Follow the directions given by the office only. 9/21/22   Seymour Edwards MD   oxybutynin (DITROPAN XL) 10 MG extended release tablet Take 1 tablet by mouth in the morning.   Patient not taking: No sig reported 7/25/22   Genaro Alves DO   apixaban (ELIQUIS) 5 MG TABS tablet 1 p.o. daily  Patient taking differently: 1 p.o. daily    Pt instructed to follow surgeon's instructions 6/28/22   Genaro Alves DO   pantoprazole (PROTONIX) 40 MG tablet Take 1 tablet by mouth daily TAKE 1 TABLET EVERY DAY 6/9/22   Genaro Alves DO   citalopram (CELEXA) 40 MG tablet Take 1 tablet by mouth daily  Patient taking differently: Take 40 mg by mouth at bedtime 6/9/22   Genaro Alves DO   levothyroxine (SYNTHROID) 50 MCG tablet Take 1 tablet by mouth Daily TAKE 1 TABLET BY MOUTH DAILY (BEFORE BREAKFAST) FOR HYPOTHYROIDISM 6/9/22   Ino Ambriz DO   cycloSPORINE (RESTASIS) 0.05 % ophthalmic emulsion  9/21/19   Historical Provider, MD   hydroxychloroquine (PLAQUENIL) 200 mg tablet daily 9/24/15   Historical Provider, MD   traMADol (ULTRAM) 50 MG tablet TAKE 1 TABLET BY MOUTH 4 TIMES DAILY AS NEEDED FOR PAIN 4/29/22   Historical Provider, MD   triamcinolone (KENALOG) 0.1 % cream MIX WITH ANY MOISTURIZER AND APPLY CREAM EXTERNALLY TO AFFECTED AREA THREE TIMES DAILY  Patient not taking: Reported on 10/4/2022 2/22/22   Historical Provider, MD   Calcium Carbonate-Vitamin D (OYSTER SHELL CALCIUM/D) 500-200 MG-UNIT TABS Take by mouth    Ar Automatic Reconciliation   vitamin D3 (CHOLECALCIFEROL) 10 MCG (400 UNIT) TABS tablet Take by mouth    Ar Automatic Reconciliation   gabapentin (NEURONTIN) 400 MG capsule Take 400 mg by mouth 4 times daily. 6/8/17   Ar Automatic Reconciliation   loperamide (IMODIUM) 2 MG capsule Take 2 mg by mouth 4 times daily as needed    Ar Automatic Reconciliation   naloxone 4 MG/0.1ML LIQD nasal spray ADMINISTER A SINGLE SPRAY IN ONE NOSTRIL UPON SIGNS OF OPIOID OVERDOSE. CALL 911. REPEAT AFTER 3 MINUTES IF NO RESPONSE. 2/15/22   Ar Automatic Reconciliation   risperiDONE (RISPERDAL) 1 MG tablet Take 1 mg by mouth nightly 10/8/15   Ar Automatic Reconciliation   spironolactone (ALDACTONE) 50 MG tablet Take 1 tablet by mouth 3 times daily 9/4/18   Ar Automatic Reconciliation       Allergies: Allergies   Allergen Reactions    Hydrocodone-Acetaminophen Other (See Comments)    Thimerosal Shortness Of Breath and Other (See Comments)     Localized redness  Other reaction(s): redness/burning    Adhesive Tape Other (See Comments)     localized redness and blisters - paper tape is ok    Codeine Itching       Physical Exam:     Vital Signs: /60   Pulse 98   Temp 98.9 °F (37.2 °C) (Temporal)   Resp 16   Ht 5' 4\" (1.626 m)   Wt 170 lb 4.8 oz (77.2 kg)   SpO2 91%   BMI 29.23 kg/m²   .     General: no distress      Heart: regular   Lungs: unlabored   Abdominal: soft   Neurological: Grossly normal Findings/Diagnosis: Screening for colorectal cancer     Plan of Care/Planned Procedure: Colonoscopy, possible polypectomy. Pt/designee has reviewed the colonoscopy information sheet. Any questions have been discussed. They agree to proceed.       Signed:  Chalino Brown MD   10/5/2022

## 2022-10-05 NOTE — ANESTHESIA POSTPROCEDURE EVALUATION
Department of Anesthesiology  Postprocedure Note    Patient: Maritza Figueroa  MRN: [id number]  YOB: 1950  Date of evaluation: 10/5/2022      Procedure Summary     Date: 10/05/22 Room / Location: [facility] ENDO 01 / [facility] ENDOSCOPY    Anesthesia Start: 8150 Anesthesia Stop: 8015    Procedure: COLONOSCOPY POLYPECTOMY HOT BIOPSY (Lower GI Region) Diagnosis:       Special screening for malignant neoplasms, colon      (Special screening for malignant neoplasms, colon [Z12.11])    Providers: Patel Ren MD Responsible Provider: Gus Schwarz DO    Anesthesia Type: TIVA ASA Status: 3          Anesthesia Type: No value filed.     Alexa Phase I: Alexa Score: 8    Alexa Phase II: Alexa Score: 10      Anesthesia Post Evaluation    Level of consciousness: awake and alert  Airway patency: patent  Nausea & Vomiting: no nausea  Complications: no  Cardiovascular status: hemodynamically stable  Respiratory status: acceptable  Hydration status: euvolemic

## 2022-10-05 NOTE — ANESTHESIA PRE PROCEDURE
Department of Anesthesiology  Preprocedure Note       Name:  Maritza Figueroa   Age:  81 y.o.  :  1950                                          MRN:  [id number]         Date:  10/5/2022      Surgeon: Donis Alves):  Patel Ren MD    Procedure: Procedure(s):  COLORECTAL CANCER SCREENING, NOT HIGH RISK//bmi 29    Medications prior to admission:   Prior to Admission medications    Medication Sig Start Date End Date Taking? Authorizing Provider   diazePAM (VALIUM) 10 MG tablet TAKE 1 TABLET BY MOUTH TWICE DAILY 22   Historical Provider, MD   mirabegron (MYRBETRIQ) 25 MG TB24 Take 1 tablet by mouth daily  Patient not taking: No sig reported 22   Melanie Lamp, DO   Sodium Sulfate-Mag Sulfate-KCl (SUTAB) 2495-102-940 MG TABS Take 1 box by mouth in the morning and at bedtime Follow the directions given by the office only. 22   Patel Ren MD   oxybutynin (DITROPAN XL) 10 MG extended release tablet Take 1 tablet by mouth in the morning.   Patient not taking: No sig reported 22   Melanie Lamp, DO   apixaban (ELIQUIS) 5 MG TABS tablet 1 p.o. daily  Patient taking differently: 1 p.o. daily    Pt instructed to follow surgeon's instructions 22   Melanie Lamp, DO   pantoprazole (PROTONIX) 40 MG tablet Take 1 tablet by mouth daily TAKE 1 TABLET EVERY DAY 22   Melanie Lamp, DO   citalopram (CELEXA) 40 MG tablet Take 1 tablet by mouth daily  Patient taking differently: Take 40 mg by mouth at bedtime 22   Melanie Lamp, DO   levothyroxine (SYNTHROID) 50 MCG tablet Take 1 tablet by mouth Daily TAKE 1 TABLET BY MOUTH DAILY (BEFORE BREAKFAST) FOR HYPOTHYROIDISM 22   Melanie Lamp, DO   cycloSPORINE (RESTASIS) 0.05 % ophthalmic emulsion  19   Historical Provider, MD   hydroxychloroquine (PLAQUENIL) 200 mg tablet daily 9/24/15   Historical Provider, MD   traMADol (ULTRAM) 50 MG tablet TAKE 1 TABLET BY MOUTH 4 TIMES DAILY AS NEEDED FOR PAIN 22   Historical Provider, MD   triamcinolone (KENALOG) 0.1 % cream MIX WITH ANY MOISTURIZER AND APPLY CREAM EXTERNALLY TO AFFECTED AREA THREE TIMES DAILY  Patient not taking: Reported on 10/4/2022 2/22/22   Historical Provider, MD   Calcium Carbonate-Vitamin D (OYSTER SHELL CALCIUM/D) 500-200 MG-UNIT TABS Take by mouth    Ar Automatic Reconciliation   vitamin D3 (CHOLECALCIFEROL) 10 MCG (400 UNIT) TABS tablet Take by mouth    Ar Automatic Reconciliation   gabapentin (NEURONTIN) 400 MG capsule Take 400 mg by mouth 4 times daily. 6/8/17   Ar Automatic Reconciliation   loperamide (IMODIUM) 2 MG capsule Take 2 mg by mouth 4 times daily as needed    Ar Automatic Reconciliation   naloxone 4 MG/0.1ML LIQD nasal spray ADMINISTER A SINGLE SPRAY IN ONE NOSTRIL UPON SIGNS OF OPIOID OVERDOSE. CALL 911. REPEAT AFTER 3 MINUTES IF NO RESPONSE. 2/15/22   Ar Automatic Reconciliation   risperiDONE (RISPERDAL) 1 MG tablet Take 1 mg by mouth nightly 10/8/15   Ar Automatic Reconciliation   spironolactone (ALDACTONE) 50 MG tablet Take 1 tablet by mouth 3 times daily 9/4/18   Ar Automatic Reconciliation       Current medications:    Current Facility-Administered Medications   Medication Dose Route Frequency Provider Last Rate Last Admin    lidocaine 1 % injection 1 mL  1 mL IntraDERmal Once PRN Brenda Vera DO        lactated ringers infusion   IntraVENous Continuous Brenda Krishnan  mL/hr at 10/05/22 0729 New Bag at 10/05/22 0729       Allergies:     Allergies   Allergen Reactions    Hydrocodone-Acetaminophen Other (See Comments)    Thimerosal Shortness Of Breath and Other (See Comments)     Localized redness  Other reaction(s): redness/burning    Adhesive Tape Other (See Comments)     localized redness and blisters - paper tape is ok    Codeine Itching       Problem List:    Patient Active Problem List   Diagnosis Code    Chronic pain G89.29    Autoimmune disease ([facility] [address] M35.9    GERD (gastroesophageal reflux disease) K21.9  CTS (carpal tunnel syndrome) G56.00    Anxiety F41.9    Acquired hypothyroidism E03.9    Depression F32. A    TMJ (temporomandibular joint syndrome) M26.609    Fibromyalgia M79.7    Raynaud phenomenon I73.00    Mild depression F32. A    Sleep apnea G47.30    Psychotic disorder (HCC) F29    IBS (irritable bowel syndrome) K58.9    Arthritis M19.90    Sjogren-Belkys syndrome Q87.19    Chronic renal disease, stage III [facility]) [[id number]] N18.30    Special screening for malignant neoplasms, colon Z12.11       Past Medical History:        Diagnosis Date    Acquired hypothyroidism     Acute deep vein thrombosis (DVT) of femoral vein of left lower extremity (HCC)     on Eliquis, secondary to COVID infection 2/2022    Anxiety     Arthritis     Back and extremities    Autoimmune disease (HCC)     Chronic pain     Fibromyalgia    CTS (carpal tunnel syndrome)     Depression     Fibromyalgia     GERD (gastroesophageal reflux disease)     Also has IBS    IBS (irritable bowel syndrome)     Menopause     NIKOLAS on CPAP     Other ill-defined conditions(799.89)     Raynaud's and peripheral neuropathy    Psychotic disorder (HCC)     sechizophrinia    Raynaud phenomenon     Single subsegmental pulmonary embolism without acute cor pulmonale (HCC)     Sjogren-Belkys syndrome     Sleep apnea     Thyroid disease     Hypothyroid    TMJ (temporomandibular joint syndrome)     Varicose veins        Past Surgical History:        Procedure Laterality Date    BREAST SURGERY  1983 - placed 1983    breast implants - removed 2005    CHOLECYSTECTOMY  11/2008    COLONOSCOPY  10/30/2014    Dr. Jaziel Azevedo in 10 years.     GI      anal sphincter repair x 2     HEENT  1970's    deviated septum repair    US BREAST NEEDLE BIOPSY LEFT Left 11/8/2021    US GUIDE INTACT BREAST BIOPSY [facility] CC AMB HISTORICAL       Social History:    Social History     Tobacco Use    Smoking status: Former     Packs/day: 0.05     Years: 7.00     Pack years: 0.35     Types: Cigarettes     Start date: 2001     Quit date: 2008     Years since quittin.0    Smokeless tobacco: Never    Tobacco comments:     Quit smoking: currently quitting   Substance Use Topics    Alcohol use: No     Alcohol/week: 0.0 standard drinks                                Counseling given: Not Answered  Tobacco comments: Quit smoking: currently quitting      Vital Signs (Current):   Vitals:    10/04/22 1225 10/05/22 0700   BP:  110/60   Pulse:  98   Resp:  16   Temp:  98.9 °F (37.2 °C)   TempSrc:  Temporal   SpO2:  91%   Weight: 169 lb (76.7 kg) 170 lb 4.8 oz (77.2 kg)   Height: 5' 4\" (1.626 m)                                               BP Readings from Last 3 Encounters:   10/05/22 110/60   22 118/76   22 122/78       NPO Status: Time of last liquid consumption:                         Time of last solid consumption:                         Date of last liquid consumption: 10/04/22                        Date of last solid food consumption: 10/03/22    BMI:   Wt Readings from Last 3 Encounters:   10/05/22 170 lb 4.8 oz (77.2 kg)   22 169 lb (76.7 kg)   22 170 lb 12.8 oz (77.5 kg)     Body mass index is 29.23 kg/m².     CBC:   Lab Results   Component Value Date/Time    WBC 8.2 2022 11:01 AM    RBC 4.33 2022 11:01 AM    HGB 13.6 2022 11:01 AM    HCT 40.5 2022 11:01 AM    MCV 93.5 2022 11:01 AM    MCV 96.8 10/13/2021 02:40 PM    RDW 12.1 2022 11:01 AM     2022 11:01 AM       CMP:   Lab Results   Component Value Date/Time     2022 11:01 AM    K 4.0 2022 11:01 AM     2022 11:01 AM    CO2 27 2022 11:01 AM    BUN 13 2022 11:01 AM    CREATININE 1.40 2022 11:01 AM    GFRAA 48 2022 11:01 AM    AGRATIO 1.7 2019 02:20 PM    LABGLOM 40 2022 11:01 AM    GLUCOSE 91 2022 11:01 AM    PROT 7.1 2022 11:01 AM    CALCIUM 9.1 07/08/2022 11:01 AM    BILITOT 0.7 07/08/2022 11:01 AM    ALKPHOS 85 07/08/2022 11:01 AM    ALKPHOS 85 10/13/2021 02:31 PM    AST 24 07/08/2022 11:01 AM    ALT 26 07/08/2022 11:01 AM       POC Tests: No results for input(s): POCGLU, POCNA, POCK, POCCL, POCBUN, POCHEMO, POCHCT in the last 72 hours. Coags: No results found for: PROTIME, INR, APTT    HCG (If Applicable): No results found for: PREGTESTUR, PREGSERUM, HCG, HCGQUANT     ABGs: No results found for: PHART, PO2ART, OKX4WKR, GSF7OLZ, BEART, K7WKQSGM     Type & Screen (If Applicable):  No results found for: LABABO, LABRH    Drug/Infectious Status (If Applicable):  Lab Results   Component Value Date/Time    HEPCAB NONREACTIVE 06/01/2022 07:51 AM       COVID-19 Screening (If Applicable):   Lab Results   Component Value Date/Time    COVID19 Detected 02/24/2022 11:25 AM           Anesthesia Evaluation    Airway: Mallampati: III          Dental: normal exam         Pulmonary:normal exam  breath sounds clear to auscultation  (+) sleep apnea: on CPAP,                             Cardiovascular:            Rhythm: regular  Rate: normal                    Neuro/Psych:   (+) psychiatric history:            GI/Hepatic/Renal:   (+) GERD: well controlled,           Endo/Other:    (+) hypothyroidism::., .                 Abdominal:             Vascular:   + PE. Other Findings:           Anesthesia Plan      TIVA     ASA 3       Induction: intravenous. Anesthetic plan and risks discussed with patient. Plan discussed with CRNA.                     Napoleon Arteaga DO   10/5/2022

## 2022-10-07 ENCOUNTER — TELEMEDICINE (OUTPATIENT)
Dept: FAMILY MEDICINE CLINIC | Facility: CLINIC | Age: 72
End: 2022-10-07
Payer: MEDICARE

## 2022-10-07 DIAGNOSIS — N39.3 STRESS INCONTINENCE OF URINE: ICD-10-CM

## 2022-10-07 PROCEDURE — 99442 PR PHYS/QHP TELEPHONE EVALUATION 11-20 MIN: CPT | Performed by: FAMILY MEDICINE

## 2022-10-07 RX ORDER — OXYBUTYNIN CHLORIDE 15 MG/1
15 TABLET, EXTENDED RELEASE ORAL DAILY
Qty: 30 TABLET | Refills: 3 | Status: SHIPPED | OUTPATIENT
Start: 2022-10-07

## 2022-10-07 ASSESSMENT — ENCOUNTER SYMPTOMS
SHORTNESS OF BREATH: 0
VOMITING: 0
NAUSEA: 0

## 2022-10-07 NOTE — PROGRESS NOTES
PROGRESS NOTE  This visit was conducted via the phone with patient's consent to the visit and all associated charges to reduce the patient's risk of exposure to COVID-19 and continuity of care for an established patient. She and/or her healthcare decision maker is aware that this patient-initiated phone encounter is a billable service, with coverage as determined by her insurance carrier. She is aware that she may receive a bill and has provided verbal consent to proceed: Yes    Vitals and physical exam deferred due to telephone visit. Total Time: minutes: 11-20 minutes. SUBJECTIVE:   Rita Aguiar is a 81 y.o. female seen for a follow up visit regarding the following chief complaint:     Chief Complaint   Patient presents with    Follow-up           HPI  Patient is doing a phone call visit to follow-up on her urge incontinence states that Myrbetriq is not covered by her insurance wants to try something different    Past Medical History, Past Surgical History, Family history, Social History, and Medications were all reviewed with the patient today and updated as necessary. Current Outpatient Medications   Medication Sig Dispense Refill    oxybutynin (DITROPAN XL) 15 MG extended release tablet Take 1 tablet by mouth daily 30 tablet 3    diazePAM (VALIUM) 10 MG tablet TAKE 1 TABLET BY MOUTH TWICE DAILY      Sodium Sulfate-Mag Sulfate-KCl (SUTAB) 6515-099-421 MG TABS Take 1 box by mouth in the morning and at bedtime Follow the directions given by the office only.  24 tablet 0    apixaban (ELIQUIS) 5 MG TABS tablet 1 p.o. daily (Patient taking differently: 1 p.o. daily    Pt instructed to follow surgeon's instructions) 90 tablet 3    pantoprazole (PROTONIX) 40 MG tablet Take 1 tablet by mouth daily TAKE 1 TABLET EVERY DAY 90 tablet 3    citalopram (CELEXA) 40 MG tablet Take 1 tablet by mouth daily (Patient taking differently: Take 40 mg by mouth at bedtime) 90 tablet 3    levothyroxine (SYNTHROID) 50 MCG tablet Take 1 tablet by mouth Daily TAKE 1 TABLET BY MOUTH DAILY (BEFORE BREAKFAST) FOR HYPOTHYROIDISM 90 tablet 3    cycloSPORINE (RESTASIS) 0.05 % ophthalmic emulsion       hydroxychloroquine (PLAQUENIL) 200 mg tablet daily      traMADol (ULTRAM) 50 MG tablet TAKE 1 TABLET BY MOUTH 4 TIMES DAILY AS NEEDED FOR PAIN      triamcinolone (KENALOG) 0.1 % cream MIX WITH ANY MOISTURIZER AND APPLY CREAM EXTERNALLY TO AFFECTED AREA THREE TIMES DAILY (Patient not taking: Reported on 10/4/2022)      Calcium Carbonate-Vitamin D (OYSTER SHELL CALCIUM/D) 500-200 MG-UNIT TABS Take by mouth      vitamin D3 (CHOLECALCIFEROL) 10 MCG (400 UNIT) TABS tablet Take by mouth      gabapentin (NEURONTIN) 400 MG capsule Take 400 mg by mouth 4 times daily. loperamide (IMODIUM) 2 MG capsule Take 2 mg by mouth 4 times daily as needed      naloxone 4 MG/0.1ML LIQD nasal spray ADMINISTER A SINGLE SPRAY IN ONE NOSTRIL UPON SIGNS OF OPIOID OVERDOSE. CALL 911. REPEAT AFTER 3 MINUTES IF NO RESPONSE.      risperiDONE (RISPERDAL) 1 MG tablet Take 1 mg by mouth nightly      spironolactone (ALDACTONE) 50 MG tablet Take 1 tablet by mouth 3 times daily       No current facility-administered medications for this visit.      Allergies   Allergen Reactions    Hydrocodone-Acetaminophen Other (See Comments)    Thimerosal Shortness Of Breath and Other (See Comments)     Localized redness  Other reaction(s): redness/burning    Adhesive Tape Other (See Comments)     localized redness and blisters - paper tape is ok    Codeine Itching     Patient Active Problem List   Diagnosis    Chronic pain    Autoimmune disease (HCC)    GERD (gastroesophageal reflux disease)    CTS (carpal tunnel syndrome)    Anxiety    Acquired hypothyroidism    Depression    TMJ (temporomandibular joint syndrome)    Fibromyalgia    Raynaud phenomenon    Mild depression    Sleep apnea    Psychotic disorder (HCC)    IBS (irritable bowel syndrome)    Arthritis    Sjogren-Belkys syndrome Chronic renal disease, stage III ([facility] [address] [845967]    Special screening for malignant neoplasms, colon    Benign neoplasm of cecum    Benign neoplasm of sigmoid colon     Past Medical History:   Diagnosis Date    Acquired hypothyroidism     Acute deep vein thrombosis (DVT) of femoral vein of left lower extremity (HCC)     on Eliquis, secondary to COVID infection 2/2022    Anxiety     Arthritis     Back and extremities    Autoimmune disease (HCC)     Chronic pain     Fibromyalgia    CTS (carpal tunnel syndrome)     Depression     Fibromyalgia     GERD (gastroesophageal reflux disease)     Also has IBS    IBS (irritable bowel syndrome)     Menopause     NIKOLAS on CPAP     Other ill-defined conditions(799.89)     Raynaud's and peripheral neuropathy    Psychotic disorder (HCC)     sechizophrinia    Raynaud phenomenon     Single subsegmental pulmonary embolism without acute cor pulmonale (HCC)     Sjogren-Belkys syndrome     Sleep apnea     Thyroid disease     Hypothyroid    TMJ (temporomandibular joint syndrome)     Varicose veins      Past Surgical History:   Procedure Laterality Date    BREAST SURGERY  1983 - placed 1983    breast implants - removed 2005    CHOLECYSTECTOMY  11/2008    COLONOSCOPY  10/30/2014    Dr. Jen Nguyen in 10 years. COLONOSCOPY N/A 10/5/2022    COLONOSCOPY POLYPECTOMY HOT BIOPSY performed by Jacinto Elizabeth MD at [facility]    GI      anal sphincter repair x 2     HEENT  1970's    deviated septum repair    US BREAST NEEDLE BIOPSY LEFT Left 11/8/2021    US GUIDE INTACT BREAST BIOPSY LT [facility] AMB HISTORICAL     Family History   Problem Relation Age of Onset    Other Neg Hx     Post-op Cognitive Dysfunction Neg Hx     Emergence Delirium Neg Hx     Post-op Nausea/Vomiting Neg Hx     Delayed Awakening Neg Hx     Pseudochol.  Deficiency Neg Hx     Malig Hypertherm Neg Hx     Tuberculosis Other     No Known Problems Mother     No Known Problems Father      Social History     Tobacco Use Smoking status: Former     Packs/day: 0.05     Years: 7.00     Pack years: 0.35     Types: Cigarettes     Start date: 2001     Quit date: 2008     Years since quittin.0    Smokeless tobacco: Never    Tobacco comments:     Quit smoking: currently quitting   Substance Use Topics    Alcohol use: No     Alcohol/week: 0.0 standard drinks         Review of Systems   Constitutional:  Negative for fatigue and fever. Respiratory:  Negative for shortness of breath. Cardiovascular:  Negative for chest pain. Gastrointestinal:  Negative for nausea and vomiting. OBJECTIVE:  There were no vitals taken for this visit. Physical Exam     Medical problems and test results were reviewed with the patient today. Recent Results (from the past 672 hour(s))   AMB POC URINALYSIS DIP STICK AUTO W/O MICRO    Collection Time: 22  2:17 PM   Result Value Ref Range    Color, Urine, POC yellow     Clarity, Urine, POC clear     Glucose, Urine, POC Negative Negative    Bilirubin, Urine, POC Negative Negative    Ketones, Urine, POC Negative Negative    Specific Gravity, Urine, POC 1.005 1.001 - 1.035    Blood, Urine, POC Negative Negative    pH, Urine, POC 6.0 4.6 - 8.0    Protein, Urine, POC Negative Negative    Urobilinogen, POC Normal     Nitrate, Urine, POC Negative Negative    Leukocyte Esterase, Urine, POC Negative Negative       ASSESSMENT and PLAN    Visit Diagnoses and Associated Orders       Stress incontinence of urine        oxybutynin (DITROPAN XL) 15 MG extended release tablet [[id number]]                       Diagnosis Orders   1. Stress incontinence of urine  oxybutynin (DITROPAN XL) 15 MG extended release tablet      , Marilin Dowell was seen today for follow-up. Diagnoses and all orders for this visit:    Stress incontinence of urine  -     oxybutynin (DITROPAN XL) 15 MG extended release tablet;  Take 1 tablet by mouth daily    , Will increase patient's Ditropan since Myrbetriq is not on her drug plan and she will follow-up to let us know how she is doing on 15 mg

## 2022-10-11 DIAGNOSIS — I82.4Y9 DEEP VEIN THROMBOSIS (DVT) OF PROXIMAL LOWER EXTREMITY, UNSPECIFIED CHRONICITY, UNSPECIFIED LATERALITY (HCC): Primary | ICD-10-CM

## 2022-10-12 PROBLEM — Z12.11 SPECIAL SCREENING FOR MALIGNANT NEOPLASMS, COLON: Status: RESOLVED | Noted: 2022-07-28 | Resolved: 2022-10-12

## 2022-10-18 ENCOUNTER — TELEPHONE (OUTPATIENT)
Dept: ONCOLOGY | Age: 72
End: 2022-10-18

## 2022-10-20 ENCOUNTER — TELEPHONE (OUTPATIENT)
Dept: ONCOLOGY | Age: 72
End: 2022-10-20

## 2022-10-21 ENCOUNTER — TELEPHONE (OUTPATIENT)
Dept: FAMILY MEDICINE CLINIC | Facility: CLINIC | Age: 72
End: 2022-10-21

## 2022-10-21 DIAGNOSIS — M19.90 ARTHRITIS: Primary | ICD-10-CM

## 2022-10-21 RX ORDER — HYDROXYCHLOROQUINE SULFATE 200 MG/1
200 TABLET, FILM COATED ORAL DAILY
Qty: 90 TABLET | Refills: 0 | Status: SHIPPED | OUTPATIENT
Start: 2022-10-21

## 2022-10-21 NOTE — TELEPHONE ENCOUNTER
Pt called requesting a 90 day supply of plaquenil to [place] [id number] until she gets in to see another rheumatologist.

## 2022-10-21 NOTE — TELEPHONE ENCOUNTER
Patient states that her apt with rheumatologist is in 45 days and wants her PCP to write a Rx until then.

## 2022-10-26 ENCOUNTER — TELEPHONE (OUTPATIENT)
Dept: ONCOLOGY | Age: 72
End: 2022-10-26

## 2022-10-30 ENCOUNTER — HOSPITAL ENCOUNTER (EMERGENCY)
Age: 72
Discharge: HOME | End: 2022-10-31
Attending: STUDENT IN AN ORGANIZED HEALTH CARE EDUCATION/TRAINING PROGRAM
Payer: MEDICARE

## 2022-10-30 VITALS
HEIGHT: 65 IN | HEART RATE: 98 BPM | SYSTOLIC BLOOD PRESSURE: 129 MMHG | BODY MASS INDEX: 27.49 KG/M2 | WEIGHT: 165 LBS | RESPIRATION RATE: 16 BRPM | DIASTOLIC BLOOD PRESSURE: 87 MMHG | OXYGEN SATURATION: 97 % | TEMPERATURE: 98.3 F

## 2022-10-30 DIAGNOSIS — T14.8XXA ABRASION: Primary | ICD-10-CM

## 2022-10-30 PROCEDURE — 99284 EMERGENCY DEPT VISIT MOD MDM: CPT

## 2022-10-30 ASSESSMENT — PAIN - FUNCTIONAL ASSESSMENT: PAIN_FUNCTIONAL_ASSESSMENT: 0-10

## 2022-10-30 ASSESSMENT — PAIN SCALES - GENERAL: PAINLEVEL_OUTOF10: 4

## 2022-10-30 ASSESSMENT — PAIN DESCRIPTION - ORIENTATION: ORIENTATION: LEFT;LOWER

## 2022-10-30 ASSESSMENT — PAIN DESCRIPTION - LOCATION: LOCATION: LEG

## 2022-10-31 ENCOUNTER — HOSPITAL ENCOUNTER (EMERGENCY)
Dept: ULTRASOUND IMAGING | Age: 72
Discharge: HOME | End: 2022-11-03
Payer: MEDICARE

## 2022-10-31 PROCEDURE — 93971 EXTREMITY STUDY: CPT

## 2022-10-31 ASSESSMENT — ENCOUNTER SYMPTOMS
SORE THROAT: 0
PHOTOPHOBIA: 0
DIARRHEA: 0
SHORTNESS OF BREATH: 0
COUGH: 0
VOMITING: 0
NAUSEA: 0

## 2022-10-31 NOTE — DISCHARGE INSTRUCTIONS
Use bacitracin ointment to the superficial abrasion to your left leg. Do not use Neosporin. Follow-up with primary care physician as needed peer return to the ER for worsening or worrisome symptoms.

## 2022-10-31 NOTE — ED TRIAGE NOTES
Pt c/o left lower leg pain and swelling started yesterday. Pt states she had a blood clot in left leg in 7/2022. Pt states she is on eliquis.

## 2022-10-31 NOTE — ED PROVIDER NOTES
Emergency Department Provider Note                   PCP:                Darin Payton DO               Age: 81 y.o. Sex: female       ICD-10-CM    1. Abrasion  T14. 8XXA           DISPOSITION Decision To Discharge 10/31/2022 01:45:45 AM        MDM  Number of Diagnoses or Management Options  Abrasion  Diagnosis management comments: 29-year-old female presents ER with abrasion to the left lower extremity. Has been using Neosporin. Advised to discontinue this at this is likely the source of the irritation to the skin. Concern for DVT, ultrasound revealed no emergent findings. She has remained stable will be discharged home. Advised to see her family physician within 1 week. Given strict turn precautions. She voiced understanding and agreement. Amount and/or Complexity of Data Reviewed  Tests in the radiology section of CPT®: ordered and reviewed    Risk of Complications, Morbidity, and/or Mortality  Presenting problems: moderate  Diagnostic procedures: moderate  Management options: low               Orders Placed This Encounter   Procedures    Vascular duplex lower extremity venous left        Medications - No data to display    New Prescriptions    No medications on file        Nabil Gonzalez is a 81 y.o. female who presents to the Emergency Department with chief complaint of    Chief Complaint   Patient presents with    Leg Swelling      29-year-old female presents to the emerged department with a worsening redness to the superficial abrasion to her anterior left lower extremity. On her distal left shin. States she has discomfort in that region as well as concern for possible swelling. Has a history of DVT previously and was concerned that she may have recurrence of 1. Able to ambulate without difficulty. Normal sensation. Denies fever or chills. Unsure what injured her over the past few days. States she just noticed the abrasion yesterday.   Has been using Neosporin and was concerned because she thought the redness was getting worse. Review of Systems   Constitutional:  Negative for chills and fever. HENT:  Negative for sore throat. Eyes:  Negative for photophobia. Respiratory:  Negative for cough and shortness of breath. Cardiovascular:  Negative for chest pain. Gastrointestinal:  Negative for diarrhea, nausea and vomiting. Genitourinary:  Negative for dysuria. Musculoskeletal:  Negative for neck pain and neck stiffness. Skin:  Positive for wound. Negative for rash. Neurological:  Negative for syncope and headaches. Psychiatric/Behavioral:  Negative for confusion. All other systems reviewed and are negative. Past Medical History:   Diagnosis Date    Acquired hypothyroidism     Acute deep vein thrombosis (DVT) of femoral vein of left lower extremity (HCC)     on Eliquis, secondary to COVID infection 2/2022    Anxiety     Arthritis     Back and extremities    Autoimmune disease (HCC)     Chronic pain     Fibromyalgia    CTS (carpal tunnel syndrome)     Depression     Fibromyalgia     GERD (gastroesophageal reflux disease)     Also has IBS    IBS (irritable bowel syndrome)     Menopause     NIKOLAS on CPAP     Other ill-defined conditions(799.89)     Raynaud's and peripheral neuropathy    Psychotic disorder ([facility] [address]     sechizophrinia    Raynaud phenomenon     Single subsegmental pulmonary embolism without acute cor pulmonale (HCC)     Sjogren-Belkys syndrome     Sleep apnea     Thyroid disease     Hypothyroid    TMJ (temporomandibular joint syndrome)     Varicose veins         Past Surgical History:   Procedure Laterality Date    BREAST SURGERY  1983 - placed 1983    breast implants - removed 2005    CHOLECYSTECTOMY  11/2008    COLONOSCOPY  10/30/2014    Dr. Yuri Magdaleno in 10 years.     COLONOSCOPY N/A 10/5/2022    COLONOSCOPY POLYPECTOMY HOT BIOPSY performed by Boo Ellison MD at [facility]    GI      anal sphincter repair x 2     HEENT  1970's    deviated septum repair    US BREAST NEEDLE BIOPSY LEFT Left 2021    US GUIDE INTACT BREAST BIOPSY LT [facility] AMB HISTORICAL        Family History   Problem Relation Age of Onset    Other Neg Hx     Post-op Cognitive Dysfunction Neg Hx     Emergence Delirium Neg Hx     Post-op Nausea/Vomiting Neg Hx     Delayed Awakening Neg Hx     Pseudochol. Deficiency Neg Hx     Malig Hypertherm Neg Hx     Tuberculosis Other     No Known Problems Mother     No Known Problems Father         Social History     Socioeconomic History    Marital status:    Tobacco Use    Smoking status: Former     Packs/day: 0.05     Years: 7.00     Pack years: 0.35     Types: Cigarettes     Start date: 2001     Quit date: 2008     Years since quittin.0    Smokeless tobacco: Never    Tobacco comments:     Quit smoking: currently quitting   Vaping Use    Vaping Use: Never used   Substance and Sexual Activity    Alcohol use: No     Alcohol/week: 0.0 standard drinks    Drug use: No         Hydrocodone-acetaminophen, Thimerosal, Adhesive tape, and Codeine     Previous Medications    APIXABAN (ELIQUIS) 5 MG TABS TABLET    1 p.o. daily    CALCIUM CARBONATE-VITAMIN D (OYSTER SHELL CALCIUM/D) 500-200 MG-UNIT TABS    Take by mouth    CITALOPRAM (CELEXA) 40 MG TABLET    Take 1 tablet by mouth daily    CYCLOSPORINE (RESTASIS) 0.05 % OPHTHALMIC EMULSION        DIAZEPAM (VALIUM) 10 MG TABLET    TAKE 1 TABLET BY MOUTH TWICE DAILY    GABAPENTIN (NEURONTIN) 400 MG CAPSULE    Take 400 mg by mouth 4 times daily.     HYDROXYCHLOROQUINE (PLAQUENIL) 200 MG TABLET    daily    HYDROXYCHLOROQUINE (PLAQUENIL) 200 MG TABLET    Take 1 tablet by mouth daily    LEVOTHYROXINE (SYNTHROID) 50 MCG TABLET    Take 1 tablet by mouth Daily TAKE 1 TABLET BY MOUTH DAILY (BEFORE BREAKFAST) FOR HYPOTHYROIDISM    LOPERAMIDE (IMODIUM) 2 MG CAPSULE    Take 2 mg by mouth 4 times daily as needed    NALOXONE 4 MG/0.1ML LIQD NASAL SPRAY    ADMINISTER A SINGLE SPRAY IN ONE NOSTRIL UPON SIGNS OF OPIOID OVERDOSE. CALL 911. REPEAT AFTER 3 MINUTES IF NO RESPONSE. OXYBUTYNIN (DITROPAN XL) 15 MG EXTENDED RELEASE TABLET    Take 1 tablet by mouth daily    PANTOPRAZOLE (PROTONIX) 40 MG TABLET    Take 1 tablet by mouth daily TAKE 1 TABLET EVERY DAY    RISPERIDONE (RISPERDAL) 1 MG TABLET    Take 1 mg by mouth nightly    SODIUM SULFATE-MAG SULFATE-KCL (SUTAB) 9353-498-330 MG TABS    Take 1 box by mouth in the morning and at bedtime Follow the directions given by the office only. SPIRONOLACTONE (ALDACTONE) 50 MG TABLET    Take 1 tablet by mouth 3 times daily    TRAMADOL (ULTRAM) 50 MG TABLET    TAKE 1 TABLET BY MOUTH 4 TIMES DAILY AS NEEDED FOR PAIN    TRIAMCINOLONE (KENALOG) 0.1 % CREAM    MIX WITH ANY MOISTURIZER AND APPLY CREAM EXTERNALLY TO AFFECTED AREA THREE TIMES DAILY    VITAMIN D3 (CHOLECALCIFEROL) 10 MCG (400 UNIT) TABS TABLET    Take by mouth        Vitals signs and nursing note reviewed. Patient Vitals for the past 4 hrs:   Temp Pulse Resp BP SpO2   10/30/22 2246 98.3 °F (36.8 °C) 98 16 129/87 97 %          Physical Exam  Vitals and nursing note reviewed. Constitutional:       General: She is not in acute distress. Appearance: Normal appearance. HENT:      Head: Normocephalic. Nose: Nose normal.      Mouth/Throat:      Mouth: Mucous membranes are moist.   Eyes:      Extraocular Movements: Extraocular movements intact. Cardiovascular:      Rate and Rhythm: Normal rate and regular rhythm. Pulses: Normal pulses. Heart sounds: Normal heart sounds. Pulmonary:      Effort: Pulmonary effort is normal. No respiratory distress. Breath sounds: Normal breath sounds. Abdominal:      General: Abdomen is flat. Palpations: Abdomen is soft. Tenderness: There is no abdominal tenderness. Musculoskeletal:         General: No swelling or tenderness. Normal range of motion. Cervical back: Normal range of motion. No rigidity.    Skin:     General: Skin is warm.      Findings: No rash. Comments: Small superficial abrasion to the distal anterior left shin. No induration or fluctuance. Minimal surrounding erythema possibly from Neosporin. Does not appear to be cellulitis. No streaking. No calf tenderness. No significant edema. Neurovascular intact distally. Neurological:      General: No focal deficit present. Mental Status: She is alert and oriented to person, place, and time. Psychiatric:         Mood and Affect: Mood normal.        Procedures    Results for orders placed or performed during the hospital encounter of 10/30/22   Vascular duplex lower extremity venous left    Narrative    EXAM: Left leg venous ultrasound. INDICATION: Left leg pain and swelling. COMPARISON: Prior ultrasound on July 6, 2022. TECHNIQUE: Evaluation of the left leg veins was performed utilizing grey-scale,  color Doppler and duplex ultrasound. FINDINGS: The left common femoral, superficial femoral, deep femoral, popliteal,  posterior tibialis, peroneal and greater saphenous veins are patent and  compressible, with normal response to augmentation. Impression    No evidence of left leg DVT. Vascular duplex lower extremity venous left   Final Result   No evidence of left leg DVT. Voice dictation software was used during the making of this note. This software is not perfect and grammatical and other typographical errors may be present. This note has not been completely proofread for errors.         Emma Davidson,   10/31/22 8809

## 2022-10-31 NOTE — ED NOTES
I have reviewed discharge instructions with the patient. The patient verbalized understanding. Patient left ED via Discharge Method: ambulatory to Home with self. Opportunity for questions and clarification provided. Patient given 0 scripts. To continue your aftercare when you leave the hospital, you may receive an automated call from our care team to check in on how you are doing. This is a free service and part of our promise to provide the best care and service to meet your aftercare needs.  If you have questions, or wish to unsubscribe from this service please call [phone]. Thank you for Choosing our [facility] Emergency Department.           Erica Sadler RN  10/31/22 9810

## 2022-11-04 ENCOUNTER — OFFICE VISIT (OUTPATIENT)
Dept: ONCOLOGY | Age: 72
End: 2022-11-04
Payer: MEDICARE

## 2022-11-04 ENCOUNTER — TELEPHONE (OUTPATIENT)
Dept: FAMILY MEDICINE CLINIC | Facility: CLINIC | Age: 72
End: 2022-11-04

## 2022-11-04 ENCOUNTER — HOSPITAL ENCOUNTER (OUTPATIENT)
Dept: LAB | Age: 72
Discharge: HOME | End: 2022-11-07
Payer: MEDICARE

## 2022-11-04 VITALS
DIASTOLIC BLOOD PRESSURE: 70 MMHG | BODY MASS INDEX: 28.42 KG/M2 | HEART RATE: 92 BPM | OXYGEN SATURATION: 95 % | SYSTOLIC BLOOD PRESSURE: 112 MMHG | WEIGHT: 166.5 LBS | HEIGHT: 64 IN | RESPIRATION RATE: 12 BRPM | TEMPERATURE: 98.6 F

## 2022-11-04 DIAGNOSIS — F41.9 ANXIETY: ICD-10-CM

## 2022-11-04 DIAGNOSIS — F33.0 MILD EPISODE OF RECURRENT MAJOR DEPRESSIVE DISORDER (HCC): ICD-10-CM

## 2022-11-04 DIAGNOSIS — M19.90 ARTHRITIS: Primary | ICD-10-CM

## 2022-11-04 DIAGNOSIS — N18.30 STAGE 3 CHRONIC KIDNEY DISEASE, UNSPECIFIED WHETHER STAGE 3A OR 3B CKD (HCC): ICD-10-CM

## 2022-11-04 DIAGNOSIS — I82.4Y9 DEEP VEIN THROMBOSIS (DVT) OF PROXIMAL LOWER EXTREMITY, UNSPECIFIED CHRONICITY, UNSPECIFIED LATERALITY (HCC): ICD-10-CM

## 2022-11-04 DIAGNOSIS — I82.4Y9 DEEP VEIN THROMBOSIS (DVT) OF PROXIMAL LOWER EXTREMITY, UNSPECIFIED CHRONICITY, UNSPECIFIED LATERALITY (HCC): Primary | ICD-10-CM

## 2022-11-04 LAB
ALBUMIN SERPL-MCNC: 4.2 G/DL (ref 3.2–4.6)
ALBUMIN/GLOB SERPL: 1.2 {RATIO} (ref 0.4–1.6)
ALP SERPL-CCNC: 97 U/L (ref 50–136)
ALT SERPL-CCNC: 26 U/L (ref 12–65)
ANION GAP SERPL CALC-SCNC: 6 MMOL/L (ref 2–11)
AST SERPL-CCNC: 18 U/L (ref 15–37)
BASOPHILS # BLD: 0 K/UL (ref 0–0.2)
BASOPHILS NFR BLD: 1 % (ref 0–2)
BILIRUB SERPL-MCNC: 0.5 MG/DL (ref 0.2–1.1)
BUN SERPL-MCNC: 14 MG/DL (ref 8–23)
CALCIUM SERPL-MCNC: 10.1 MG/DL (ref 8.3–10.4)
CHLORIDE SERPL-SCNC: 105 MMOL/L (ref 101–110)
CO2 SERPL-SCNC: 27 MMOL/L (ref 21–32)
CREAT SERPL-MCNC: 1.3 MG/DL (ref 0.6–1)
DIFFERENTIAL METHOD BLD: NORMAL
EOSINOPHIL # BLD: 0.2 K/UL (ref 0–0.8)
EOSINOPHIL NFR BLD: 3 % (ref 0.5–7.8)
ERYTHROCYTE [DISTWIDTH] IN BLOOD BY AUTOMATED COUNT: 12 % (ref 11.9–14.6)
GLOBULIN SER CALC-MCNC: 3.5 G/DL (ref 2.8–4.5)
GLUCOSE SERPL-MCNC: 122 MG/DL (ref 65–100)
HCT VFR BLD AUTO: 42.7 % (ref 35.8–46.3)
HGB BLD-MCNC: 14.2 G/DL (ref 11.7–15.4)
IMM GRANULOCYTES # BLD AUTO: 0 K/UL (ref 0–0.5)
IMM GRANULOCYTES NFR BLD AUTO: 1 % (ref 0–5)
LYMPHOCYTES # BLD: 2 K/UL (ref 0.5–4.6)
LYMPHOCYTES NFR BLD: 31 % (ref 13–44)
MCH RBC QN AUTO: 31.4 PG (ref 26.1–32.9)
MCHC RBC AUTO-ENTMCNC: 33.3 G/DL (ref 31.4–35)
MCV RBC AUTO: 94.5 FL (ref 82–102)
MONOCYTES # BLD: 0.8 K/UL (ref 0.1–1.3)
MONOCYTES NFR BLD: 12 % (ref 4–12)
NEUTS SEG # BLD: 3.4 K/UL (ref 1.7–8.2)
NEUTS SEG NFR BLD: 52 % (ref 43–78)
NRBC # BLD: 0 K/UL (ref 0–0.2)
PLATELET # BLD AUTO: 239 K/UL (ref 150–450)
PMV BLD AUTO: 9.6 FL (ref 9.4–12.3)
POTASSIUM SERPL-SCNC: 4.1 MMOL/L (ref 3.5–5.1)
PROT SERPL-MCNC: 7.7 G/DL (ref 6.3–8.2)
RBC # BLD AUTO: 4.52 M/UL (ref 4.05–5.2)
SODIUM SERPL-SCNC: 138 MMOL/L (ref 133–143)
WBC # BLD AUTO: 6.4 K/UL (ref 4.3–11.1)

## 2022-11-04 PROCEDURE — 36415 COLL VENOUS BLD VENIPUNCTURE: CPT

## 2022-11-04 PROCEDURE — 1090F PRES/ABSN URINE INCON ASSESS: CPT | Performed by: NURSE PRACTITIONER

## 2022-11-04 PROCEDURE — G8400 PT W/DXA NO RESULTS DOC: HCPCS | Performed by: NURSE PRACTITIONER

## 2022-11-04 PROCEDURE — 1123F ACP DISCUSS/DSCN MKR DOCD: CPT | Performed by: NURSE PRACTITIONER

## 2022-11-04 PROCEDURE — G8484 FLU IMMUNIZE NO ADMIN: HCPCS | Performed by: NURSE PRACTITIONER

## 2022-11-04 PROCEDURE — 99214 OFFICE O/P EST MOD 30 MIN: CPT | Performed by: NURSE PRACTITIONER

## 2022-11-04 PROCEDURE — G8427 DOCREV CUR MEDS BY ELIG CLIN: HCPCS | Performed by: NURSE PRACTITIONER

## 2022-11-04 PROCEDURE — 3017F COLORECTAL CA SCREEN DOC REV: CPT | Performed by: NURSE PRACTITIONER

## 2022-11-04 PROCEDURE — 85025 COMPLETE CBC W/AUTO DIFF WBC: CPT

## 2022-11-04 PROCEDURE — G8417 CALC BMI ABV UP PARAM F/U: HCPCS | Performed by: NURSE PRACTITIONER

## 2022-11-04 PROCEDURE — 1036F TOBACCO NON-USER: CPT | Performed by: NURSE PRACTITIONER

## 2022-11-04 PROCEDURE — 80053 COMPREHEN METABOLIC PANEL: CPT

## 2022-11-04 ASSESSMENT — PATIENT HEALTH QUESTIONNAIRE - PHQ9
SUM OF ALL RESPONSES TO PHQ QUESTIONS 1-9: 1
2. FEELING DOWN, DEPRESSED OR HOPELESS: 1
SUM OF ALL RESPONSES TO PHQ QUESTIONS 1-9: 1

## 2022-11-04 NOTE — TELEPHONE ENCOUNTER
----- Message from Balwinder Mendoza, [address] sent at 11/4/2022  4:11 PM EDT -----  Regarding: REFERRAL  Patient called requesting referral to a different rheumatologist. She states she has seen Dr Paxton Lara and that she had mentioned to you she would like to see someone else.

## 2022-11-04 NOTE — PROGRESS NOTES
[facility]ematology and Oncology: Office Visit Established Patient    Chief Complaint:    Chief Complaint   Patient presents with    Follow-up         History of Present Illness:  Ms. Parvin Rao is a 81 y.o. female who returns today for management of VTE. She presented to the ED at [facility] on 3/23/22 for left lower extremity edema, she was found to have DVT, a subsequent ED visit on 3/26/22 demonstrated a RLL subsegmental PE. She was hemodynamically stable and she was discharged from the ED with a prescription for Eliquis. She  has no personal or family history of VTE. She was seen by vascular surgery at [facility], they felt that this was most likely caused by recent COVID infection. She is on appropriate anticoagulation and should continue this for at least 3 months (through June 2022). Afterward, she will have repeat D-dimer and ultrasound, if normal with resolution of symptoms, she will be eligible to discontinue therapy as this was likely provoked. She has had some serologies looking for an inherited thrombophilia, unfortunately some of these were likely affected by active thrombus and anticoagulation (especially ATIII and LAC). Therefore, I would plan to repeat these after she completes anticoagulation, and also include full beta-2-GP-I and aCL testing. She returns today for routine follow up on Eliquis. She endorses compliance and denies any concerns. There has been no bleeding, however reports easy bruising to BLEs. There is no shortness of breath or chest pain. No calf pain or edema, but reports general b/l leg pain. She reports dealing with depression and anxiety and is concerned with future DVT/PE. Review of Systems:  Constitutional: Negative. HENT: Negative. Eyes: Negative. Respiratory: Negative. Cardiovascular: Negative. Gastrointestinal: Negative. Genitourinary: Negative. Musculoskeletal: Negative. Skin: Negative. Neurological: Negative. Endo/Heme/Allergies: Negative. Psychiatric/Behavioral: Negative. All other systems reviewed and are negative. Allergies   Allergen Reactions    Hydrocodone-Acetaminophen Other (See Comments)    Thimerosal Shortness Of Breath and Other (See Comments)     Localized redness  Other reaction(s): redness/burning    Adhesive Tape Other (See Comments)     localized redness and blisters - paper tape is ok    Codeine Itching     Past Medical History:   Diagnosis Date    Acquired hypothyroidism     Acute deep vein thrombosis (DVT) of femoral vein of left lower extremity (HCC)     on Eliquis, secondary to COVID infection 2/2022    Anxiety     Arthritis     Back and extremities    Autoimmune disease (HCC)     Chronic pain     Fibromyalgia    CTS (carpal tunnel syndrome)     Depression     Fibromyalgia     GERD (gastroesophageal reflux disease)     Also has IBS    IBS (irritable bowel syndrome)     Menopause     NIKOLAS on CPAP     Other ill-defined conditions(799.89)     Raynaud's and peripheral neuropathy    Psychotic disorder (HCC)     sechizophrinia    Raynaud phenomenon     Single subsegmental pulmonary embolism without acute cor pulmonale (HCC)     Sjogren-Belkys syndrome     Sleep apnea     Thyroid disease     Hypothyroid    TMJ (temporomandibular joint syndrome)     Varicose veins      Past Surgical History:   Procedure Laterality Date    BREAST SURGERY  1983 - placed 1983    breast implants - removed 2005    CHOLECYSTECTOMY  11/2008    COLONOSCOPY  10/30/2014    Dr. Faustina Morales in 10 years.     COLONOSCOPY N/A 10/5/2022    COLONOSCOPY POLYPECTOMY HOT BIOPSY performed by Leann Gonzales MD at [facility]    GI      anal sphincter repair x 2     HEENT  1970's    deviated septum repair    US BREAST NEEDLE BIOPSY LEFT Left 11/8/2021    US GUIDE INTACT BREAST BIOPSY LT [address] [facility] AMB HISTORICAL     Family History   Problem Relation Age of Onset    Other Neg Hx     Post-op Cognitive Dysfunction Neg Hx     Emergence Delirium Neg Hx     Post-op Nausea/Vomiting Neg Hx     Delayed Awakening Neg Hx     Pseudochol.  Deficiency Neg Hx     Malig Hypertherm Neg Hx     Tuberculosis Other     No Known Problems Mother     No Known Problems Father      Social History     Socioeconomic History    Marital status:      Spouse name: Not on file    Number of children: Not on file    Years of education: Not on file    Highest education level: Not on file   Occupational History    Not on file   Tobacco Use    Smoking status: Former     Packs/day: 0.05     Years: 7.00     Pack years: 0.35     Types: Cigarettes     Start date: 2001     Quit date: 2008     Years since quittin.1    Smokeless tobacco: Never    Tobacco comments:     Quit smoking: currently quitting   Vaping Use    Vaping Use: Never used   Substance and Sexual Activity    Alcohol use: No     Alcohol/week: 0.0 standard drinks    Drug use: No    Sexual activity: Not on file   Other Topics Concern    Not on file   Social History Narrative    Not on file     Social Determinants of Health     Financial Resource Strain: Not on file   Food Insecurity: Not on file   Transportation Needs: Not on file   Physical Activity: Not on file   Stress: Not on file   Social Connections: Not on file   Intimate Partner Violence: Not on file   Housing Stability: Not on file     Current Outpatient Medications   Medication Sig Dispense Refill    oxybutynin (DITROPAN XL) 15 MG extended release tablet Take 1 tablet by mouth daily 30 tablet 3    diazePAM (VALIUM) 10 MG tablet TAKE 1 TABLET BY MOUTH TWICE DAILY      apixaban (ELIQUIS) 5 MG TABS tablet 1 p.o. daily (Patient taking differently: 1 p.o. daily    Pt instructed to follow surgeon's instructions) 90 tablet 3    pantoprazole (PROTONIX) 40 MG tablet Take 1 tablet by mouth daily TAKE 1 TABLET EVERY DAY (Patient taking differently: Take 40 mg by mouth daily PRN  TAKE 1 TABLET EVERY DAY) 90 tablet 3    citalopram (CELEXA) 40 MG tablet Take 1 tablet by mouth daily atraumatic. Sclerae anicteric. Neck supple without JVD. No thyromegaly present. Lymph node   Deferred. Skin Warm and dry. No bruising and no rash noted. No erythema. No pallor. Respiratory Lungs are clear to auscultation bilaterally without wheezes, rales or rhonchi, normal air exchange without accessory muscle use. CVS Normal rate, regular rhythm and normal S1 and S2. No murmurs, gallops, or rubs. Abdomen Soft, nontender and nondistended, normoactive bowel sounds. Neuro Grossly nonfocal with no obvious sensory or motor deficits. MSK Normal range of motion in general.  No edema and no tenderness. Psych Appropriate mood and affect.       Labs:  Recent Results (from the past 96 hour(s))   CBC with Auto Differential    Collection Time: 11/04/22 12:44 PM   Result Value Ref Range    WBC 6.4 4.3 - 11.1 K/uL    RBC 4.52 4.05 - 5.2 M/uL    Hemoglobin 14.2 11.7 - 15.4 g/dL    Hematocrit 42.7 35.8 - 46.3 %    MCV 94.5 82.0 - 102.0 FL    MCH 31.4 26.1 - 32.9 PG    MCHC 33.3 31.4 - 35.0 g/dL    RDW 12.0 11.9 - 14.6 %    Platelets 738 880 - 122 K/uL    MPV 9.6 9.4 - 12.3 FL    nRBC 0.00 0.0 - 0.2 K/uL    Seg Neutrophils 52 43 - 78 %    Lymphocytes 31 13 - 44 %    Monocytes 12 4.0 - 12.0 %    Eosinophils % 3 0.5 - 7.8 %    Basophils 1 0.0 - 2.0 %    Immature Granulocytes 1 0.0 - 5.0 %    Segs Absolute 3.4 1.7 - 8.2 K/UL    Absolute Lymph # 2.0 0.5 - 4.6 K/UL    Absolute Mono # 0.8 0.1 - 1.3 K/UL    Absolute Eos # 0.2 0.0 - 0.8 K/UL    Basophils Absolute 0.0 0.0 - 0.2 K/UL    Absolute Immature Granulocyte 0.0 0.0 - 0.5 K/UL    Differential Type AUTOMATED     Comprehensive Metabolic Panel    Collection Time: 11/04/22 12:44 PM   Result Value Ref Range    Sodium 138 133 - 143 mmol/L    Potassium 4.1 3.5 - 5.1 mmol/L    Chloride 105 101 - 110 mmol/L    CO2 27 21 - 32 mmol/L    Anion Gap 6 2 - 11 mmol/L    Glucose 122 (H) 65 - 100 mg/dL    BUN 14 8 - 23 MG/DL    Creatinine 1.30 (H) 0.6 - 1.0 MG/DL    Est, Glom Filt Rate 44 (L) >60 ml/min/1.73m2    Calcium 10.1 8.3 - 10.4 MG/DL    Total Bilirubin 0.5 0.2 - 1.1 MG/DL    ALT 26 12 - 65 U/L    AST 18 15 - 37 U/L    Alk Phosphatase 97 50 - 136 U/L    Total Protein 7.7 6.3 - 8.2 g/dL    Albumin 4.2 3.2 - 4.6 g/dL    Globulin 3.5 2.8 - 4.5 g/dL    Albumin/Globulin Ratio 1.2 0.4 - 1.6         Imaging:  Vascular duplex lower extremity venous left    Result Date: 7/6/2022  1. No evidence for left lower extremity DVT. ASSESSMENT:   Diagnosis Orders   1. Deep vein thrombosis (DVT) of proximal lower extremity, unspecified chronicity, unspecified laterality (HCC)        2. Stage 3 chronic kidney disease, unspecified whether stage 3a or 3b CKD ([facility] [address]        3. Anxiety        4. Mild episode of recurrent major depressive disorder [facility])              PLAN:  Lab studies were personally reviewed. VTE: with DVT diagnosed March 2022, small RLL subsegmental PE as well, possibly instigated by COVID infection. No personal or family history of VTE. On Eliquis for therapy. I discussed the pathophysiology and natural history of VTE with Ms. Tc Pina. She is on appropriate anticoagulation and should continue this for at least  3 months (through June 2022). Afterward, she will have repeat D-dimer and ultrasound, if normal with resolution of symptoms, she will be eligible to discontinue therapy as this was likely provoked. She has had some serologies looking for an inherited thrombophilia, unfortunately some of these were likely affected by active thrombus and anticoagulation (especially ATIII and LAC). Therefore, I would plan to repeat these after she completes anticoagulation, and also include full beta-2-GP-I and aCL testing. She returns today for routine follow up on Eliquis. She endorses compliance and denies any concerns. There has been no bleeding, however reports easy bruising to BLEs. There is no shortness of breath or chest pain.   No calf pain or edema, but reports general b/l leg pain. She reports dealing with depression and anxiety and is concerned with future DVT/PE. Back in July labs were completed for LAC and other antibodies during an Eliquis break for a procedure and they were all normal.  Since she has completed >6 month of anticoagulation and labs are normal and did not reveal inherited thrombophilia, she can now discontinue her Eliquis. Alarm symptoms were reviewed and she was instructed to call with any concerns. Return for follow up in 3 months and can likely follow prn from there is doing well. She gave verbal understanding. All questions were asked and answered to the best of my ability.            Burt Maza, RUFINA - [address] Hematology and Oncology  [address] [address]  Office : [phone]  Fax : [phone]

## 2022-11-23 DIAGNOSIS — K21.9 GASTROESOPHAGEAL REFLUX DISEASE WITHOUT ESOPHAGITIS: ICD-10-CM

## 2022-11-23 RX ORDER — PANTOPRAZOLE SODIUM 40 MG/1
TABLET, DELAYED RELEASE ORAL
Qty: 90 TABLET | Refills: 0 | Status: SHIPPED | OUTPATIENT
Start: 2022-11-23

## 2022-12-23 ENCOUNTER — TELEPHONE (OUTPATIENT)
Dept: FAMILY MEDICINE CLINIC | Facility: CLINIC | Age: 72
End: 2022-12-23

## 2022-12-27 ENCOUNTER — TELEPHONE (OUTPATIENT)
Dept: ONCOLOGY | Age: 72
End: 2022-12-27

## 2022-12-27 NOTE — TELEPHONE ENCOUNTER
Pt request a call back to discuss notes from her After Visit Summary that state Stage 3 Chronic Kidney disease.

## 2022-12-27 NOTE — TELEPHONE ENCOUNTER
I reviewed the chart and called the patient to return the call. I called the patient again and explained to her the creatinine is a little elevated. She has been a little elevated since 2019. She will speak to her PCP when she goes to see them. I did ask her if she started any new medicine in the last year.  She Verbalizes understanding of instructions

## 2022-12-28 ENCOUNTER — TELEPHONE (OUTPATIENT)
Dept: FAMILY MEDICINE CLINIC | Facility: CLINIC | Age: 72
End: 2022-12-28

## 2022-12-28 NOTE — TELEPHONE ENCOUNTER
Pt is worried about her CKD and wants a referral to a specialist, we LVM to call us back to schedule one apt with Dr Kaitlynn Gray.

## 2022-12-29 ENCOUNTER — TELEPHONE (OUTPATIENT)
Dept: FAMILY MEDICINE CLINIC | Facility: CLINIC | Age: 72
End: 2022-12-29

## 2023-01-05 ENCOUNTER — NURSE ONLY (OUTPATIENT)
Dept: FAMILY MEDICINE CLINIC | Facility: CLINIC | Age: 73
End: 2023-01-05

## 2023-01-05 ENCOUNTER — OFFICE VISIT (OUTPATIENT)
Dept: FAMILY MEDICINE CLINIC | Facility: CLINIC | Age: 73
End: 2023-01-05
Payer: MEDICARE

## 2023-01-05 VITALS
HEIGHT: 64 IN | SYSTOLIC BLOOD PRESSURE: 120 MMHG | BODY MASS INDEX: 28.68 KG/M2 | DIASTOLIC BLOOD PRESSURE: 80 MMHG | WEIGHT: 168 LBS

## 2023-01-05 DIAGNOSIS — N39.3 STRESS INCONTINENCE OF URINE: Primary | ICD-10-CM

## 2023-01-05 DIAGNOSIS — N18.9 CHRONIC KIDNEY DISEASE, UNSPECIFIED CKD STAGE: ICD-10-CM

## 2023-01-05 DIAGNOSIS — N30.00 ACUTE CYSTITIS WITHOUT HEMATURIA: ICD-10-CM

## 2023-01-05 DIAGNOSIS — F41.9 ANXIETY: ICD-10-CM

## 2023-01-05 LAB
BILIRUBIN, URINE, POC: ABNORMAL
BLOOD URINE, POC: ABNORMAL
GLUCOSE URINE, POC: NEGATIVE
KETONES, URINE, POC: ABNORMAL
LEUKOCYTE ESTERASE, URINE, POC: ABNORMAL
NITRITE, URINE, POC: NEGATIVE
PH, URINE, POC: 5.5 (ref 4.6–8)
PROTEIN,URINE, POC: ABNORMAL
SPECIFIC GRAVITY, URINE, POC: 1.03 (ref 1–1.03)
URINALYSIS CLARITY, POC: ABNORMAL
URINALYSIS COLOR, POC: ABNORMAL
UROBILINOGEN, POC: NORMAL

## 2023-01-05 PROCEDURE — G8484 FLU IMMUNIZE NO ADMIN: HCPCS | Performed by: FAMILY MEDICINE

## 2023-01-05 PROCEDURE — 3017F COLORECTAL CA SCREEN DOC REV: CPT | Performed by: FAMILY MEDICINE

## 2023-01-05 PROCEDURE — 81003 URINALYSIS AUTO W/O SCOPE: CPT | Performed by: FAMILY MEDICINE

## 2023-01-05 PROCEDURE — G8400 PT W/DXA NO RESULTS DOC: HCPCS | Performed by: FAMILY MEDICINE

## 2023-01-05 PROCEDURE — 1036F TOBACCO NON-USER: CPT | Performed by: FAMILY MEDICINE

## 2023-01-05 PROCEDURE — G8427 DOCREV CUR MEDS BY ELIG CLIN: HCPCS | Performed by: FAMILY MEDICINE

## 2023-01-05 PROCEDURE — G8417 CALC BMI ABV UP PARAM F/U: HCPCS | Performed by: FAMILY MEDICINE

## 2023-01-05 PROCEDURE — 1123F ACP DISCUSS/DSCN MKR DOCD: CPT | Performed by: FAMILY MEDICINE

## 2023-01-05 PROCEDURE — 99214 OFFICE O/P EST MOD 30 MIN: CPT | Performed by: FAMILY MEDICINE

## 2023-01-05 PROCEDURE — 1090F PRES/ABSN URINE INCON ASSESS: CPT | Performed by: FAMILY MEDICINE

## 2023-01-05 RX ORDER — SULFAMETHOXAZOLE AND TRIMETHOPRIM 800; 160 MG/1; MG/1
1 TABLET ORAL 2 TIMES DAILY
Qty: 14 TABLET | Refills: 0 | Status: SHIPPED | OUTPATIENT
Start: 2023-01-05 | End: 2023-01-12

## 2023-01-05 SDOH — ECONOMIC STABILITY: FOOD INSECURITY: WITHIN THE PAST 12 MONTHS, YOU WORRIED THAT YOUR FOOD WOULD RUN OUT BEFORE YOU GOT MONEY TO BUY MORE.: NEVER TRUE

## 2023-01-05 SDOH — ECONOMIC STABILITY: FOOD INSECURITY: WITHIN THE PAST 12 MONTHS, THE FOOD YOU BOUGHT JUST DIDN'T LAST AND YOU DIDN'T HAVE MONEY TO GET MORE.: NEVER TRUE

## 2023-01-05 ASSESSMENT — PATIENT HEALTH QUESTIONNAIRE - PHQ9
3. TROUBLE FALLING OR STAYING ASLEEP: 1
10. IF YOU CHECKED OFF ANY PROBLEMS, HOW DIFFICULT HAVE THESE PROBLEMS MADE IT FOR YOU TO DO YOUR WORK, TAKE CARE OF THINGS AT HOME, OR GET ALONG WITH OTHER PEOPLE: 0
5. POOR APPETITE OR OVEREATING: 0
2. FEELING DOWN, DEPRESSED OR HOPELESS: 1
7. TROUBLE CONCENTRATING ON THINGS, SUCH AS READING THE NEWSPAPER OR WATCHING TELEVISION: 0
SUM OF ALL RESPONSES TO PHQ QUESTIONS 1-9: 3
SUM OF ALL RESPONSES TO PHQ QUESTIONS 1-9: 3
9. THOUGHTS THAT YOU WOULD BE BETTER OFF DEAD, OR OF HURTING YOURSELF: 0
8. MOVING OR SPEAKING SO SLOWLY THAT OTHER PEOPLE COULD HAVE NOTICED. OR THE OPPOSITE, BEING SO FIGETY OR RESTLESS THAT YOU HAVE BEEN MOVING AROUND A LOT MORE THAN USUAL: 0
SUM OF ALL RESPONSES TO PHQ QUESTIONS 1-9: 3
6. FEELING BAD ABOUT YOURSELF - OR THAT YOU ARE A FAILURE OR HAVE LET YOURSELF OR YOUR FAMILY DOWN: 0
1. LITTLE INTEREST OR PLEASURE IN DOING THINGS: 1
4. FEELING TIRED OR HAVING LITTLE ENERGY: 0
SUM OF ALL RESPONSES TO PHQ QUESTIONS 1-9: 3
SUM OF ALL RESPONSES TO PHQ9 QUESTIONS 1 & 2: 2

## 2023-01-05 ASSESSMENT — ENCOUNTER SYMPTOMS
NAUSEA: 0
SHORTNESS OF BREATH: 0
VOMITING: 0

## 2023-01-05 ASSESSMENT — SOCIAL DETERMINANTS OF HEALTH (SDOH): HOW HARD IS IT FOR YOU TO PAY FOR THE VERY BASICS LIKE FOOD, HOUSING, MEDICAL CARE, AND HEATING?: NOT HARD AT ALL

## 2023-01-05 NOTE — PROGRESS NOTES
PROGRESS NOTE    SUBJECTIVE:   Fe Peralta is a 81 y.o. female seen for a follow up visit regarding the following chief complaint:     Chief Complaint   Patient presents with    Urinary Tract Infection     Increase frequency, urine incontinence    Chronic Kidney Disease     Pt has question about her Dx of CKD           HPI patient complaining of dysuria and got on MyChart and is now anxious because she has questions about her stage III chronic kidney disease its in my chart      Past Medical History, Past Surgical History, Family history, Social History, and Medications were all reviewed with the patient today and updated as necessary. Current Outpatient Medications   Medication Sig Dispense Refill    sulfamethoxazole-trimethoprim (BACTRIM DS) 800-160 MG per tablet Take 1 tablet by mouth 2 times daily for 7 days 14 tablet 0    pantoprazole (PROTONIX) 40 MG tablet TAKE 1 TABLET EVERY DAY 90 tablet 0    hydroxychloroquine (PLAQUENIL) 200 MG tablet Take 1 tablet by mouth daily 90 tablet 0    diazePAM (VALIUM) 10 MG tablet TAKE 1 TABLET BY MOUTH TWICE DAILY      Sodium Sulfate-Mag Sulfate-KCl (SUTAB) 2749-248-312 MG TABS Take 1 box by mouth in the morning and at bedtime Follow the directions given by the office only.  24 tablet 0    apixaban (ELIQUIS) 5 MG TABS tablet 1 p.o. daily (Patient taking differently: 1 p.o. daily    Pt instructed to follow surgeon's instructions) 90 tablet 3    citalopram (CELEXA) 40 MG tablet Take 1 tablet by mouth daily (Patient taking differently: Take 40 mg by mouth at bedtime) 90 tablet 3    levothyroxine (SYNTHROID) 50 MCG tablet Take 1 tablet by mouth Daily TAKE 1 TABLET BY MOUTH DAILY (BEFORE BREAKFAST) FOR HYPOTHYROIDISM 90 tablet 3    cycloSPORINE (RESTASIS) 0.05 % ophthalmic emulsion       hydroxychloroquine (PLAQUENIL) 200 mg tablet daily      traMADol (ULTRAM) 50 MG tablet TAKE 1 TABLET BY MOUTH 4 TIMES DAILY AS NEEDED FOR PAIN      triamcinolone (KENALOG) 0.1 % cream Calcium Carbonate-Vitamin D (OYSTER SHELL CALCIUM/D) 500-200 MG-UNIT TABS Take by mouth      vitamin D3 (CHOLECALCIFEROL) 10 MCG (400 UNIT) TABS tablet Take by mouth      gabapentin (NEURONTIN) 400 MG capsule Take 400 mg by mouth 4 times daily. naloxone 4 MG/0.1ML LIQD nasal spray ADMINISTER A SINGLE SPRAY IN ONE NOSTRIL UPON SIGNS OF OPIOID OVERDOSE. CALL 911. REPEAT AFTER 3 MINUTES IF NO RESPONSE.      risperiDONE (RISPERDAL) 1 MG tablet Take 1 mg by mouth nightly      spironolactone (ALDACTONE) 50 MG tablet Take 1 tablet by mouth 3 times daily      oxybutynin (DITROPAN XL) 15 MG extended release tablet Take 1 tablet by mouth daily (Patient not taking: Reported on 1/5/2023) 30 tablet 3     No current facility-administered medications for this visit.      Allergies   Allergen Reactions    Hydrocodone-Acetaminophen Other (See Comments)    Thimerosal Shortness Of Breath and Other (See Comments)     Localized redness  Other reaction(s): redness/burning    Adhesive Tape Other (See Comments)     localized redness and blisters - paper tape is ok    Codeine Itching     Patient Active Problem List   Diagnosis    Chronic pain    Autoimmune disease ([facility] [address]    GERD (gastroesophageal reflux disease)    CTS (carpal tunnel syndrome)    Anxiety    Acquired hypothyroidism    Depression    TMJ (temporomandibular joint syndrome)    Fibromyalgia    Raynaud phenomenon    Mild depression    Sleep apnea    Psychotic disorder ([facility])    IBS (irritable bowel syndrome)    Arthritis    Sjogren-Belkys syndrome    Chronic renal disease, stage III ([facility]) [997205]    Benign neoplasm of cecum    Benign neoplasm of sigmoid colon     Past Medical History:   Diagnosis Date    Acquired hypothyroidism     Acute deep vein thrombosis (DVT) of femoral vein of left lower extremity (HCC)     on Eliquis, secondary to COVID infection 2/2022    Anxiety     Arthritis     Back and extremities    Autoimmune disease ([facility])     Chronic pain     Fibromyalgia CTS (carpal tunnel syndrome)     Depression     Fibromyalgia     GERD (gastroesophageal reflux disease)     Also has IBS    IBS (irritable bowel syndrome)     Menopause     NIKOLAS on CPAP     Other ill-defined conditions(799.89)     Raynaud's and peripheral neuropathy    Psychotic disorder ([facility] [address]     sechizophrinia    Raynaud phenomenon     Single subsegmental pulmonary embolism without acute cor pulmonale (HCC)     Sjogren-Belkys syndrome     Sleep apnea     Thyroid disease     Hypothyroid    TMJ (temporomandibular joint syndrome)     Varicose veins      Past Surgical History:   Procedure Laterality Date    BREAST SURGERY   - placed     breast implants - removed     CHOLECYSTECTOMY  2008    COLONOSCOPY  10/30/2014    Dr. Arpita Sanches in 10 years. COLONOSCOPY N/A 10/5/2022    COLONOSCOPY POLYPECTOMY HOT BIOPSY performed by Liam Moreno MD at [facility]    GI      anal sphincter repair x 2     HEENT  's    deviated septum repair    US BREAST NEEDLE BIOPSY LEFT Left 2021    US GUIDE INTACT BREAST BIOPSY [facility] AMB HISTORICAL     Family History   Problem Relation Age of Onset    Other Neg Hx     Post-op Cognitive Dysfunction Neg Hx     Emergence Delirium Neg Hx     Post-op Nausea/Vomiting Neg Hx     Delayed Awakening Neg Hx     Pseudochol. Deficiency Neg Hx     Malig Hypertherm Neg Hx     Tuberculosis Other     No Known Problems Mother     No Known Problems Father      Social History     Tobacco Use    Smoking status: Former     Packs/day: 0.05     Years: 7.00     Pack years: 0.35     Types: Cigarettes     Start date: 2001     Quit date: 2008     Years since quittin.2    Smokeless tobacco: Never    Tobacco comments:     Quit smoking: currently quitting   Substance Use Topics    Alcohol use: No     Alcohol/week: 0.0 standard drinks         Review of Systems   Constitutional:  Negative for chills and fever. Respiratory:  Negative for shortness of breath. Cardiovascular:  Negative for chest pain. Gastrointestinal:  Negative for nausea and vomiting. Genitourinary:  Positive for difficulty urinating, dysuria, frequency and urgency. OBJECTIVE:  /80 (Site: Left Upper Arm, Position: Sitting, Cuff Size: Small Adult)   Ht 5' 4\" (1.626 m)   Wt 168 lb (76.2 kg)   BMI 28.84 kg/m²      Physical Exam  Vitals and nursing note reviewed. Constitutional:       Appearance: Normal appearance. HENT:      Head: Normocephalic and atraumatic. Right Ear: Tympanic membrane normal.      Left Ear: Tympanic membrane normal.      Nose: Nose normal.      Mouth/Throat:      Mouth: Mucous membranes are moist.   Eyes:      Conjunctiva/sclera: Conjunctivae normal.      Pupils: Pupils are equal, round, and reactive to light. Cardiovascular:      Rate and Rhythm: Normal rate and regular rhythm. Pulses: Normal pulses. Heart sounds: Normal heart sounds. Pulmonary:      Effort: Pulmonary effort is normal.      Breath sounds: Normal breath sounds. Abdominal:      General: Abdomen is flat. Bowel sounds are normal.      Palpations: Abdomen is soft. Musculoskeletal:         General: Normal range of motion. Cervical back: Normal range of motion and neck supple. Skin:     General: Skin is warm and dry. Neurological:      General: No focal deficit present. Mental Status: She is alert and oriented to person, place, and time. Psychiatric:         Behavior: Behavior normal.        Medical problems and test results were reviewed with the patient today.      Recent Results (from the past 672 hour(s))   AMB POC URINALYSIS DIP STICK AUTO W/O MICRO    Collection Time: 01/05/23  2:25 PM   Result Value Ref Range    Color, Urine, POC dark yellow     Clarity, Urine, POC turbid     Glucose, Urine, POC Negative Negative    Bilirubin, Urine, POC 1+ Negative    Ketones, Urine, POC 1+ Negative    Specific Gravity, Urine, POC 1.030 1.001 - 1.035    Blood, Urine, POC Trace Negative    pH, Urine, POC 5.5 4.6 - 8.0    Protein, Urine, POC 1+ Negative    Urobilinogen, POC Normal     Nitrate, Urine, POC Negative Negative    Leukocyte Esterase, Urine, POC 1+ Negative       ASSESSMENT and PLAN    Visit Diagnoses and Associated Orders       Stress incontinence of urine    -  Primary         Acute cystitis without hematuria        sulfamethoxazole-trimethoprim (BACTRIM DS) 800-160 MG per tablet [81167]           Anxiety                         Diagnosis Orders   1. Stress incontinence of urine        2. Acute cystitis without hematuria  sulfamethoxazole-trimethoprim (BACTRIM DS) 800-160 MG per tablet      3. Jaime Maza was seen today for urinary tract infection and chronic kidney disease. Diagnoses and all orders for this visit:    Stress incontinence of urine    Acute cystitis without hematuria  -     sulfamethoxazole-trimethoprim (BACTRIM DS) 800-160 MG per tablet;  Take 1 tablet by mouth 2 times daily for 7 days    Anxiety  , We will start her on an antibiotic and have her return back in 1 to 2 weeks discussed rechecking a BMP supportive care

## 2023-01-06 LAB
ALBUMIN SERPL-MCNC: 4.1 G/DL (ref 3.2–4.6)
ALBUMIN/GLOB SERPL: 1.2 {RATIO} (ref 0.4–1.6)
ALP SERPL-CCNC: 95 U/L (ref 50–136)
ALT SERPL-CCNC: 33 U/L (ref 12–65)
ANION GAP SERPL CALC-SCNC: 7 MMOL/L (ref 2–11)
AST SERPL-CCNC: 24 U/L (ref 15–37)
BILIRUB SERPL-MCNC: 0.8 MG/DL (ref 0.2–1.1)
BUN SERPL-MCNC: 19 MG/DL (ref 8–23)
CALCIUM SERPL-MCNC: 9.3 MG/DL (ref 8.3–10.4)
CHLORIDE SERPL-SCNC: 106 MMOL/L (ref 101–110)
CO2 SERPL-SCNC: 26 MMOL/L (ref 21–32)
CREAT SERPL-MCNC: 1.4 MG/DL (ref 0.6–1)
GLOBULIN SER CALC-MCNC: 3.5 G/DL (ref 2.8–4.5)
GLUCOSE SERPL-MCNC: 93 MG/DL (ref 65–100)
POTASSIUM SERPL-SCNC: 3.9 MMOL/L (ref 3.5–5.1)
PROT SERPL-MCNC: 7.6 G/DL (ref 6.3–8.2)
SODIUM SERPL-SCNC: 139 MMOL/L (ref 133–143)

## 2023-01-16 ENCOUNTER — OFFICE VISIT (OUTPATIENT)
Dept: FAMILY MEDICINE CLINIC | Facility: CLINIC | Age: 73
End: 2023-01-16
Payer: MEDICARE

## 2023-01-16 VITALS
SYSTOLIC BLOOD PRESSURE: 120 MMHG | DIASTOLIC BLOOD PRESSURE: 80 MMHG | BODY MASS INDEX: 28.68 KG/M2 | HEIGHT: 64 IN | WEIGHT: 168 LBS

## 2023-01-16 DIAGNOSIS — I26.99 OTHER PULMONARY EMBOLISM WITHOUT ACUTE COR PULMONALE, UNSPECIFIED CHRONICITY (HCC): ICD-10-CM

## 2023-01-16 DIAGNOSIS — G25.81 RESTLESS LEG: ICD-10-CM

## 2023-01-16 DIAGNOSIS — N18.30 STAGE 3 CHRONIC KIDNEY DISEASE, UNSPECIFIED WHETHER STAGE 3A OR 3B CKD (HCC): ICD-10-CM

## 2023-01-16 DIAGNOSIS — M35.9 AUTOIMMUNE DISEASE (HCC): ICD-10-CM

## 2023-01-16 DIAGNOSIS — F23 BRIEF PSYCHOTIC DISORDER (HCC): ICD-10-CM

## 2023-01-16 DIAGNOSIS — R30.0 DYSURIA: Primary | ICD-10-CM

## 2023-01-16 LAB
BILIRUBIN, URINE, POC: NEGATIVE
BLOOD URINE, POC: NEGATIVE
GLUCOSE URINE, POC: NEGATIVE
KETONES, URINE, POC: NEGATIVE
LEUKOCYTE ESTERASE, URINE, POC: NEGATIVE
NITRITE, URINE, POC: NEGATIVE
PH, URINE, POC: 5.5 (ref 4.6–8)
PROTEIN,URINE, POC: NEGATIVE
SPECIFIC GRAVITY, URINE, POC: 1.02 (ref 1–1.03)
URINALYSIS CLARITY, POC: CLEAR
URINALYSIS COLOR, POC: YELLOW
UROBILINOGEN, POC: NORMAL

## 2023-01-16 PROCEDURE — G8484 FLU IMMUNIZE NO ADMIN: HCPCS | Performed by: FAMILY MEDICINE

## 2023-01-16 PROCEDURE — 1036F TOBACCO NON-USER: CPT | Performed by: FAMILY MEDICINE

## 2023-01-16 PROCEDURE — G8428 CUR MEDS NOT DOCUMENT: HCPCS | Performed by: FAMILY MEDICINE

## 2023-01-16 PROCEDURE — G8417 CALC BMI ABV UP PARAM F/U: HCPCS | Performed by: FAMILY MEDICINE

## 2023-01-16 PROCEDURE — 1123F ACP DISCUSS/DSCN MKR DOCD: CPT | Performed by: FAMILY MEDICINE

## 2023-01-16 PROCEDURE — 99214 OFFICE O/P EST MOD 30 MIN: CPT | Performed by: FAMILY MEDICINE

## 2023-01-16 PROCEDURE — 1090F PRES/ABSN URINE INCON ASSESS: CPT | Performed by: FAMILY MEDICINE

## 2023-01-16 PROCEDURE — G8400 PT W/DXA NO RESULTS DOC: HCPCS | Performed by: FAMILY MEDICINE

## 2023-01-16 PROCEDURE — 3017F COLORECTAL CA SCREEN DOC REV: CPT | Performed by: FAMILY MEDICINE

## 2023-01-16 PROCEDURE — 81003 URINALYSIS AUTO W/O SCOPE: CPT | Performed by: FAMILY MEDICINE

## 2023-01-16 RX ORDER — ROPINIROLE 1 MG/1
1 TABLET, FILM COATED ORAL NIGHTLY
Qty: 90 TABLET | Refills: 3 | Status: SHIPPED | OUTPATIENT
Start: 2023-01-16

## 2023-01-16 ASSESSMENT — ENCOUNTER SYMPTOMS
SINUS PAIN: 0
RHINORRHEA: 0
COUGH: 0
DIARRHEA: 0
ABDOMINAL PAIN: 0
SHORTNESS OF BREATH: 0

## 2023-01-16 NOTE — PROGRESS NOTES
PROGRESS NOTE    SUBJECTIVE:   Hima Chapin is a 81 y.o. female seen for a follow up visit regarding the following chief complaint:     Chief Complaint   Patient presents with    Urinary Tract Infection           HPI  the patient presents to the office today follow-up of her urinary tract infection presents the office today states she is better wanted to talk about her checkout forms that she got stating that she has chronic kidney disease and wanted know what that meant. Also complaining about restless leg syndrome    Past Medical History, Past Surgical History, Family history, Social History, and Medications were all reviewed with the patient today and updated as necessary. Current Outpatient Medications   Medication Sig Dispense Refill    rOPINIRole (REQUIP) 1 MG tablet Take 1 tablet by mouth nightly 90 tablet 3    pantoprazole (PROTONIX) 40 MG tablet TAKE 1 TABLET EVERY DAY 90 tablet 0    hydroxychloroquine (PLAQUENIL) 200 MG tablet Take 1 tablet by mouth daily 90 tablet 0    oxybutynin (DITROPAN XL) 15 MG extended release tablet Take 1 tablet by mouth daily (Patient not taking: Reported on 1/5/2023) 30 tablet 3    diazePAM (VALIUM) 10 MG tablet TAKE 1 TABLET BY MOUTH TWICE DAILY      Sodium Sulfate-Mag Sulfate-KCl (SUTAB) 3447-941-610 MG TABS Take 1 box by mouth in the morning and at bedtime Follow the directions given by the office only.  24 tablet 0    apixaban (ELIQUIS) 5 MG TABS tablet 1 p.o. daily (Patient taking differently: 1 p.o. daily    Pt instructed to follow surgeon's instructions) 90 tablet 3    citalopram (CELEXA) 40 MG tablet Take 1 tablet by mouth daily (Patient taking differently: Take 40 mg by mouth at bedtime) 90 tablet 3    levothyroxine (SYNTHROID) 50 MCG tablet Take 1 tablet by mouth Daily TAKE 1 TABLET BY MOUTH DAILY (BEFORE BREAKFAST) FOR HYPOTHYROIDISM 90 tablet 3    cycloSPORINE (RESTASIS) 0.05 % ophthalmic emulsion       hydroxychloroquine (PLAQUENIL) 200 mg tablet daily traMADol (ULTRAM) 50 MG tablet TAKE 1 TABLET BY MOUTH 4 TIMES DAILY AS NEEDED FOR PAIN      triamcinolone (KENALOG) 0.1 % cream       Calcium Carbonate-Vitamin D (OYSTER SHELL CALCIUM/D) 500-200 MG-UNIT TABS Take by mouth      vitamin D3 (CHOLECALCIFEROL) 10 MCG (400 UNIT) TABS tablet Take by mouth      gabapentin (NEURONTIN) 400 MG capsule Take 400 mg by mouth 4 times daily. naloxone 4 MG/0.1ML LIQD nasal spray ADMINISTER A SINGLE SPRAY IN ONE NOSTRIL UPON SIGNS OF OPIOID OVERDOSE. CALL 911. REPEAT AFTER 3 MINUTES IF NO RESPONSE.      risperiDONE (RISPERDAL) 1 MG tablet Take 1 mg by mouth nightly      spironolactone (ALDACTONE) 50 MG tablet Take 1 tablet by mouth 3 times daily       No current facility-administered medications for this visit.      Allergies   Allergen Reactions    Hydrocodone-Acetaminophen Other (See Comments)    Thimerosal Shortness Of Breath and Other (See Comments)     Localized redness  Other reaction(s): redness/burning    Adhesive Tape Other (See Comments)     localized redness and blisters - paper tape is ok    Codeine Itching     Patient Active Problem List   Diagnosis    Chronic pain    Autoimmune disease ([facility] [address]    GERD (gastroesophageal reflux disease)    CTS (carpal tunnel syndrome)    Anxiety    Acquired hypothyroidism    Depression    TMJ (temporomandibular joint syndrome)    Fibromyalgia    Raynaud phenomenon    Mild depression    Sleep apnea    Psychotic disorder ([facility])    IBS (irritable bowel syndrome)    Arthritis    Sjogren-Belkys syndrome    Chronic renal disease, stage III ([facility]) [892376]    Benign neoplasm of cecum    Benign neoplasm of sigmoid colon    Other pulmonary embolism without acute cor pulmonale, unspecified chronicity ([facility])     Past Medical History:   Diagnosis Date    Acquired hypothyroidism     Acute deep vein thrombosis (DVT) of femoral vein of left lower extremity ([facility])     on Eliquis, secondary to COVID infection 2/2022    Anxiety     Arthritis     Back and extremities    Autoimmune disease ([facility] [address]     Chronic pain     Fibromyalgia    CTS (carpal tunnel syndrome)     Depression     Fibromyalgia     GERD (gastroesophageal reflux disease)     Also has IBS    IBS (irritable bowel syndrome)     Menopause     NIKOLAS on CPAP     Other ill-defined conditions(799.89)     Raynaud's and peripheral neuropathy    Psychotic disorder (HCC)     sechizophrinia    Raynaud phenomenon     Single subsegmental pulmonary embolism without acute cor pulmonale (HCC)     Sjogren-Belkys syndrome     Sleep apnea     Thyroid disease     Hypothyroid    TMJ (temporomandibular joint syndrome)     Varicose veins      Past Surgical History:   Procedure Laterality Date    BREAST SURGERY   - placed     breast implants - removed     CHOLECYSTECTOMY  2008    COLONOSCOPY  10/30/2014    Dr. Rodrigo Calvo in 10 years. COLONOSCOPY N/A 10/5/2022    COLONOSCOPY POLYPECTOMY HOT BIOPSY performed by Rama Jackson MD at [facility]    GI      anal sphincter repair x 2     HEENT      deviated septum repair    US BREAST NEEDLE BIOPSY LEFT Left 2021    US GUIDE INTACT BREAST BIOPSY LT [address] [facility] AMB HISTORICAL     Family History   Problem Relation Age of Onset    Other Neg Hx     Post-op Cognitive Dysfunction Neg Hx     Emergence Delirium Neg Hx     Post-op Nausea/Vomiting Neg Hx     Delayed Awakening Neg Hx     Pseudochol.  Deficiency Neg Hx     Malig Hypertherm Neg Hx     Tuberculosis Other     No Known Problems Mother     No Known Problems Father      Social History     Tobacco Use    Smoking status: Former     Packs/day: 0.05     Years: 7.00     Pack years: 0.35     Types: Cigarettes     Start date: 2001     Quit date: 2008     Years since quittin.3    Smokeless tobacco: Never    Tobacco comments:     Quit smoking: currently quitting   Substance Use Topics    Alcohol use: No     Alcohol/week: 0.0 standard drinks         Review of Systems   Constitutional:  Negative for chills, fatigue and fever. HENT:  Negative for congestion, rhinorrhea and sinus pain. Eyes:  Negative for visual disturbance. Respiratory:  Negative for cough and shortness of breath. Cardiovascular:  Negative for chest pain. Gastrointestinal:  Negative for abdominal pain and diarrhea. Genitourinary:  Negative for dysuria. Musculoskeletal:  Negative for arthralgias and myalgias. Restless legs   Neurological:  Negative for dizziness, weakness and headaches. Psychiatric/Behavioral: Negative. OBJECTIVE:  /80 (Site: Left Upper Arm)   Ht 5' 4\" (1.626 m)   Wt 168 lb (76.2 kg)   BMI 28.84 kg/m²      Physical Exam  Vitals and nursing note reviewed. Constitutional:       Appearance: Normal appearance. HENT:      Head: Normocephalic and atraumatic. Right Ear: Tympanic membrane normal.      Left Ear: Tympanic membrane normal.      Nose: Nose normal.      Mouth/Throat:      Mouth: Mucous membranes are moist.   Eyes:      Conjunctiva/sclera: Conjunctivae normal.      Pupils: Pupils are equal, round, and reactive to light. Cardiovascular:      Rate and Rhythm: Normal rate and regular rhythm. Pulses: Normal pulses. Heart sounds: Normal heart sounds. Pulmonary:      Effort: Pulmonary effort is normal.      Breath sounds: Normal breath sounds. Abdominal:      General: Abdomen is flat. Bowel sounds are normal.      Palpations: Abdomen is soft. Musculoskeletal:         General: Normal range of motion. Cervical back: Normal range of motion and neck supple. Skin:     General: Skin is warm and dry. Neurological:      General: No focal deficit present. Mental Status: She is alert and oriented to person, place, and time. Mental status is at baseline. Cranial Nerves: Cranial nerves 2-12 are intact. Sensory: Sensation is intact. Motor: Motor function is intact. Coordination: Coordination is intact. Gait: Gait is intact.       Deep Tendon Reflexes: Reflexes are normal and symmetric. Psychiatric:         Behavior: Behavior normal.        Medical problems and test results were reviewed with the patient today.      Recent Results (from the past 672 hour(s))   AMB POC URINALYSIS DIP STICK AUTO W/O MICRO    Collection Time: 01/05/23  2:25 PM   Result Value Ref Range    Color, Urine, POC dark yellow     Clarity, Urine, POC turbid     Glucose, Urine, POC Negative Negative    Bilirubin, Urine, POC 1+ Negative    Ketones, Urine, POC 1+ Negative    Specific Gravity, Urine, POC 1.030 1.001 - 1.035    Blood, Urine, POC Trace Negative    pH, Urine, POC 5.5 4.6 - 8.0    Protein, Urine, POC 1+ Negative    Urobilinogen, POC Normal     Nitrate, Urine, POC Negative Negative    Leukocyte Esterase, Urine, POC 1+ Negative   Comprehensive Metabolic Panel    Collection Time: 01/05/23  2:46 PM   Result Value Ref Range    Sodium 139 133 - 143 mmol/L    Potassium 3.9 3.5 - 5.1 mmol/L    Chloride 106 101 - 110 mmol/L    CO2 26 21 - 32 mmol/L    Anion Gap 7 2 - 11 mmol/L    Glucose 93 65 - 100 mg/dL    BUN 19 8 - 23 MG/DL    Creatinine 1.40 (H) 0.6 - 1.0 MG/DL    Est, Glom Filt Rate 40 (L) >60 ml/min/1.73m2    Calcium 9.3 8.3 - 10.4 MG/DL    Total Bilirubin 0.8 0.2 - 1.1 MG/DL    ALT 33 12 - 65 U/L    AST 24 15 - 37 U/L    Alk Phosphatase 95 50 - 136 U/L    Total Protein 7.6 6.3 - 8.2 g/dL    Albumin 4.1 3.2 - 4.6 g/dL    Globulin 3.5 2.8 - 4.5 g/dL    Albumin/Globulin Ratio 1.2 0.4 - 1.6     AMB POC URINALYSIS DIP STICK AUTO W/O MICRO    Collection Time: 01/16/23 11:21 AM   Result Value Ref Range    Color, Urine, POC yellow     Clarity, Urine, POC clear     Glucose, Urine, POC Negative Negative    Bilirubin, Urine, POC Negative Negative    Ketones, Urine, POC Negative Negative    Specific Gravity, Urine, POC 1.020 1.001 - 1.035    Blood, Urine, POC negative Negative    pH, Urine, POC 5.5 4.6 - 8.0    Protein, Urine, POC Negative Negative    Urobilinogen, POC normal Nitrate, Urine, POC negative Negative    Leukocyte Esterase, Urine, POC Negative Negative       ASSESSMENT and PLAN    Visit Diagnoses and Associated Orders       Dysuria    -  Primary    AMB POC URINALYSIS DIP STICK AUTO W/O MICRO [05241 CPT(R)]           Other pulmonary embolism without acute cor pulmonale, unspecified chronicity (HCC)             Autoimmune disease (HCC)             Brief psychotic disorder (HCC)             Stage 3 chronic kidney disease, unspecified whether stage 3a or 3b CKD (HCC)             Restless leg        rOPINIRole (REQUIP) 1 MG tablet [34185]                       Diagnosis Orders   1. Dysuria  AMB POC URINALYSIS DIP STICK AUTO W/O MICRO      2. Other pulmonary embolism without acute cor pulmonale, unspecified chronicity ([facility] [address]        3. Autoimmune disease ([facility] [address]        4. Brief psychotic disorder ([facility] [address]        5. Stage 3 chronic kidney disease, unspecified whether stage 3a or 3b CKD ([facility] [address]        6. Restless leg  rOPINIRole (REQUIP) 1 MG tablet      , [place] was seen today for urinary tract infection. Diagnoses and all orders for this visit:    Dysuria  -     AMB POC URINALYSIS DIP STICK AUTO W/O MICRO    Other pulmonary embolism without acute cor pulmonale, unspecified chronicity (HCC)    Autoimmune disease (HCC)    Brief psychotic disorder (HCC)    Stage 3 chronic kidney disease, unspecified whether stage 3a or 3b CKD (HCC)    Restless leg  -     rOPINIRole (REQUIP) 1 MG tablet;  Take 1 tablet by mouth nightly    , Reviewed reviewed  Reviewed all her paperwork that she had at the last check out recommended starting her on Requip 1 mg at bedtime supportive care given follow-up signs symptoms persist or worsen

## 2023-01-17 DIAGNOSIS — N39.3 STRESS INCONTINENCE OF URINE: ICD-10-CM

## 2023-01-17 RX ORDER — OXYBUTYNIN CHLORIDE 15 MG/1
15 TABLET, EXTENDED RELEASE ORAL DAILY
Qty: 90 TABLET | Refills: 3 | Status: SHIPPED | OUTPATIENT
Start: 2023-01-17

## 2023-02-02 DIAGNOSIS — I82.4Y9 DEEP VEIN THROMBOSIS (DVT) OF PROXIMAL LOWER EXTREMITY, UNSPECIFIED CHRONICITY, UNSPECIFIED LATERALITY (HCC): Primary | ICD-10-CM

## 2023-02-06 ENCOUNTER — OFFICE VISIT (OUTPATIENT)
Dept: ONCOLOGY | Age: 73
End: 2023-02-06
Payer: MEDICARE

## 2023-02-06 ENCOUNTER — HOSPITAL ENCOUNTER (OUTPATIENT)
Dept: LAB | Age: 73
Discharge: HOME | End: 2023-02-09
Payer: MEDICARE

## 2023-02-06 VITALS
TEMPERATURE: 98.2 F | WEIGHT: 169.3 LBS | DIASTOLIC BLOOD PRESSURE: 63 MMHG | RESPIRATION RATE: 16 BRPM | HEART RATE: 82 BPM | SYSTOLIC BLOOD PRESSURE: 102 MMHG | BODY MASS INDEX: 28.9 KG/M2 | OXYGEN SATURATION: 96 % | HEIGHT: 64 IN

## 2023-02-06 DIAGNOSIS — I82.4Y9 DEEP VEIN THROMBOSIS (DVT) OF PROXIMAL LOWER EXTREMITY, UNSPECIFIED CHRONICITY, UNSPECIFIED LATERALITY (HCC): Primary | ICD-10-CM

## 2023-02-06 DIAGNOSIS — I82.4Y9 DEEP VEIN THROMBOSIS (DVT) OF PROXIMAL LOWER EXTREMITY, UNSPECIFIED CHRONICITY, UNSPECIFIED LATERALITY (HCC): ICD-10-CM

## 2023-02-06 LAB
ALBUMIN SERPL-MCNC: 3.8 G/DL (ref 3.2–4.6)
ALBUMIN/GLOB SERPL: 1.2 (ref 0.4–1.6)
ALP SERPL-CCNC: 96 U/L (ref 50–136)
ALT SERPL-CCNC: 37 U/L (ref 12–65)
ANION GAP SERPL CALC-SCNC: 5 MMOL/L (ref 2–11)
AST SERPL-CCNC: 19 U/L (ref 15–37)
BASOPHILS # BLD: 0 K/UL (ref 0–0.2)
BASOPHILS NFR BLD: 1 % (ref 0–2)
BILIRUB SERPL-MCNC: 0.7 MG/DL (ref 0.2–1.1)
BUN SERPL-MCNC: 12 MG/DL (ref 8–23)
CALCIUM SERPL-MCNC: 9.1 MG/DL (ref 8.3–10.4)
CHLORIDE SERPL-SCNC: 108 MMOL/L (ref 101–110)
CO2 SERPL-SCNC: 27 MMOL/L (ref 21–32)
CREAT SERPL-MCNC: 1.2 MG/DL (ref 0.6–1)
DIFFERENTIAL METHOD BLD: NORMAL
EOSINOPHIL # BLD: 0.1 K/UL (ref 0–0.8)
EOSINOPHIL NFR BLD: 2 % (ref 0.5–7.8)
ERYTHROCYTE [DISTWIDTH] IN BLOOD BY AUTOMATED COUNT: 12 % (ref 11.9–14.6)
GLOBULIN SER CALC-MCNC: 3.3 G/DL (ref 2.8–4.5)
GLUCOSE SERPL-MCNC: 98 MG/DL (ref 65–100)
HCT VFR BLD AUTO: 40.7 % (ref 35.8–46.3)
HGB BLD-MCNC: 13.6 G/DL (ref 11.7–15.4)
IMM GRANULOCYTES # BLD AUTO: 0 K/UL (ref 0–0.5)
IMM GRANULOCYTES NFR BLD AUTO: 0 % (ref 0–5)
LYMPHOCYTES # BLD: 2.1 K/UL (ref 0.5–4.6)
LYMPHOCYTES NFR BLD: 28 % (ref 13–44)
MCH RBC QN AUTO: 31.5 PG (ref 26.1–32.9)
MCHC RBC AUTO-ENTMCNC: 33.4 G/DL (ref 31.4–35)
MCV RBC AUTO: 94.2 FL (ref 82–102)
MONOCYTES # BLD: 0.9 K/UL (ref 0.1–1.3)
MONOCYTES NFR BLD: 12 % (ref 4–12)
NEUTS SEG # BLD: 4.3 K/UL (ref 1.7–8.2)
NEUTS SEG NFR BLD: 57 % (ref 43–78)
NRBC # BLD: 0 K/UL (ref 0–0.2)
PLATELET # BLD AUTO: 213 K/UL (ref 150–450)
PMV BLD AUTO: 9.6 FL (ref 9.4–12.3)
POTASSIUM SERPL-SCNC: 3.9 MMOL/L (ref 3.5–5.1)
PROT SERPL-MCNC: 7.1 G/DL (ref 6.3–8.2)
RBC # BLD AUTO: 4.32 M/UL (ref 4.05–5.2)
SODIUM SERPL-SCNC: 140 MMOL/L (ref 133–143)
WBC # BLD AUTO: 7.5 K/UL (ref 4.3–11.1)

## 2023-02-06 PROCEDURE — 85025 COMPLETE CBC W/AUTO DIFF WBC: CPT

## 2023-02-06 PROCEDURE — 3017F COLORECTAL CA SCREEN DOC REV: CPT | Performed by: INTERNAL MEDICINE

## 2023-02-06 PROCEDURE — 80053 COMPREHEN METABOLIC PANEL: CPT

## 2023-02-06 PROCEDURE — 36415 COLL VENOUS BLD VENIPUNCTURE: CPT

## 2023-02-06 PROCEDURE — 99213 OFFICE O/P EST LOW 20 MIN: CPT | Performed by: INTERNAL MEDICINE

## 2023-02-06 PROCEDURE — G8428 CUR MEDS NOT DOCUMENT: HCPCS | Performed by: INTERNAL MEDICINE

## 2023-02-06 PROCEDURE — 1123F ACP DISCUSS/DSCN MKR DOCD: CPT | Performed by: INTERNAL MEDICINE

## 2023-02-06 PROCEDURE — G8400 PT W/DXA NO RESULTS DOC: HCPCS | Performed by: INTERNAL MEDICINE

## 2023-02-06 PROCEDURE — G8417 CALC BMI ABV UP PARAM F/U: HCPCS | Performed by: INTERNAL MEDICINE

## 2023-02-06 PROCEDURE — 1090F PRES/ABSN URINE INCON ASSESS: CPT | Performed by: INTERNAL MEDICINE

## 2023-02-06 PROCEDURE — 1036F TOBACCO NON-USER: CPT | Performed by: INTERNAL MEDICINE

## 2023-02-06 PROCEDURE — G8484 FLU IMMUNIZE NO ADMIN: HCPCS | Performed by: INTERNAL MEDICINE

## 2023-02-06 ASSESSMENT — PATIENT HEALTH QUESTIONNAIRE - PHQ9
SUM OF ALL RESPONSES TO PHQ QUESTIONS 1-9: 1
2. FEELING DOWN, DEPRESSED OR HOPELESS: 1
SUM OF ALL RESPONSES TO PHQ9 QUESTIONS 1 & 2: 1
SUM OF ALL RESPONSES TO PHQ QUESTIONS 1-9: 1
SUM OF ALL RESPONSES TO PHQ QUESTIONS 1-9: 1
1. LITTLE INTEREST OR PLEASURE IN DOING THINGS: 0
SUM OF ALL RESPONSES TO PHQ QUESTIONS 1-9: 1

## 2023-02-06 NOTE — PROGRESS NOTES
[facility] Hematology and Oncology: Office Visit Established Patient    Chief Complaint:    Chief Complaint   Patient presents with    Follow-up         History of Present Illness:  Ms. Reinaldo Paniagua is a 81 y.o. female who returns today for management of VTE. She presented to the ED at [facility] on 3/23/22 for left lower extremity edema, she was found to have DVT, a subsequent ED visit on 3/26/22 demonstrated a RLL subsegmental PE. She was hemodynamically stable and she was discharged from the ED with a prescription for Eliquis. She  has no personal or family history of VTE. She was seen by vascular surgery at [facility], they felt that this was most likely caused by recent COVID infection. She completed >6 months of treatment with no evidence of thrombophilia, and stopped Eliquis as of the fall of 2022. She returns today for routine follow up. She is doing well off Eliquis, no recurrence of LE symptoms or pulmonary symptoms. Review of Systems:  Constitutional: Negative. HENT: Negative. Eyes: Negative. Respiratory: Negative. Cardiovascular: Negative. Gastrointestinal: Negative. Genitourinary: Negative. Musculoskeletal: Negative. Skin: Negative. Neurological: Negative. Endo/Heme/Allergies: Negative. Psychiatric/Behavioral: Negative. All other systems reviewed and are negative.        Allergies   Allergen Reactions    Hydrocodone-Acetaminophen Other (See Comments)    Thimerosal Shortness Of Breath and Other (See Comments)     Localized redness  Other reaction(s): redness/burning    Adhesive Tape Other (See Comments)     localized redness and blisters - paper tape is ok    Codeine Itching     Past Medical History:   Diagnosis Date    Acquired hypothyroidism     Acute deep vein thrombosis (DVT) of femoral vein of left lower extremity (HCC)     on Eliquis, secondary to COVID infection 2/2022    Anxiety     Arthritis     Back and extremities    Autoimmune disease (HCC)     Chronic pain Fibromyalgia    CTS (carpal tunnel syndrome)     Depression     Fibromyalgia     GERD (gastroesophageal reflux disease)     Also has IBS    IBS (irritable bowel syndrome)     Menopause     NIKOLAS on CPAP     Other ill-defined conditions(799.89)     Raynaud's and peripheral neuropathy    Psychotic disorder (HCC)     sechizophrinia    Raynaud phenomenon     Single subsegmental pulmonary embolism without acute cor pulmonale (HCC)     Sjogren-Belkys syndrome     Sleep apnea     Thyroid disease     Hypothyroid    TMJ (temporomandibular joint syndrome)     Varicose veins      Past Surgical History:   Procedure Laterality Date    BREAST SURGERY   - placed     breast implants - removed     CHOLECYSTECTOMY  2008    COLONOSCOPY  10/30/2014    Dr. Zane King in 10 years. COLONOSCOPY N/A 10/5/2022    COLONOSCOPY POLYPECTOMY HOT BIOPSY performed by Wendy Acevedo MD at [facility]    GI      anal sphincter repair x 2     HEENT  's    deviated septum repair    US BREAST BIOPSY W LOC DEVICE 1ST LESION LEFT Left 2021    US GUIDE INTACT BREAST BIOPSY LT [address] [facility] AMB HISTORICAL     Family History   Problem Relation Age of Onset    Other Neg Hx     Post-op Cognitive Dysfunction Neg Hx     Emergence Delirium Neg Hx     Post-op Nausea/Vomiting Neg Hx     Delayed Awakening Neg Hx     Pseudochol.  Deficiency Neg Hx     Malig Hypertherm Neg Hx     Tuberculosis Other     No Known Problems Mother     No Known Problems Father      Social History     Socioeconomic History    Marital status:      Spouse name: Not on file    Number of children: Not on file    Years of education: Not on file    Highest education level: Not on file   Occupational History    Not on file   Tobacco Use    Smoking status: Former     Packs/day: 0.05     Years: 7.00     Pack years: 0.35     Types: Cigarettes     Start date: 2001     Quit date: 2008     Years since quittin.3    Smokeless tobacco: Never    Tobacco comments:     Quit smoking: currently quitting   Vaping Use    Vaping Use: Never used   Substance and Sexual Activity    Alcohol use: No     Alcohol/week: 0.0 standard drinks    Drug use: No    Sexual activity: Not on file   Other Topics Concern    Not on file   Social History Narrative    Not on file     Social Determinants of Health     Financial Resource Strain: Low Risk     Difficulty of Paying Living Expenses: Not hard at all   Food Insecurity: No Food Insecurity    Worried About Running Out of Food in the Last Year: Never true    Ran Out of Food in the Last Year: Never true   Transportation Needs: Not on file   Physical Activity: Not on file   Stress: Not on file   Social Connections: Not on file   Intimate Partner Violence: Not on file   Housing Stability: Not on file     Current Outpatient Medications   Medication Sig Dispense Refill    oxybutynin (DITROPAN XL) 15 MG extended release tablet Take 1 tablet by mouth daily 90 tablet 3    pantoprazole (PROTONIX) 40 MG tablet TAKE 1 TABLET EVERY DAY 90 tablet 0    hydroxychloroquine (PLAQUENIL) 200 MG tablet Take 1 tablet by mouth daily 90 tablet 0    diazePAM (VALIUM) 10 MG tablet TAKE 1 TABLET BY MOUTH TWICE DAILY      citalopram (CELEXA) 40 MG tablet Take 1 tablet by mouth daily (Patient taking differently: Take 40 mg by mouth at bedtime) 90 tablet 3    levothyroxine (SYNTHROID) 50 MCG tablet Take 1 tablet by mouth Daily TAKE 1 TABLET BY MOUTH DAILY (BEFORE BREAKFAST) FOR HYPOTHYROIDISM 90 tablet 3    cycloSPORINE (RESTASIS) 0.05 % ophthalmic emulsion       hydroxychloroquine (PLAQUENIL) 200 mg tablet daily      traMADol (ULTRAM) 50 MG tablet TAKE 1 TABLET BY MOUTH 4 TIMES DAILY AS NEEDED FOR PAIN      triamcinolone (KENALOG) 0.1 % cream       vitamin D3 (CHOLECALCIFEROL) 10 MCG (400 UNIT) TABS tablet Take by mouth      gabapentin (NEURONTIN) 400 MG capsule Take 400 mg by mouth 4 times daily.       risperiDONE (RISPERDAL) 1 MG tablet Take 1 mg by mouth nightly spironolactone (ALDACTONE) 50 MG tablet Take 1 tablet by mouth 3 times daily      rOPINIRole (REQUIP) 1 MG tablet Take 1 tablet by mouth nightly (Patient not taking: Reported on 2/6/2023) 90 tablet 3    Sodium Sulfate-Mag Sulfate-KCl (SUTAB) 4786-184-054 MG TABS Take 1 box by mouth in the morning and at bedtime Follow the directions given by the office only. (Patient not taking: Reported on 2/6/2023) 24 tablet 0    apixaban (ELIQUIS) 5 MG TABS tablet 1 p.o. daily (Patient not taking: Reported on 2/6/2023) 90 tablet 3    Calcium Carbonate-Vitamin D (OYSTER SHELL CALCIUM/D) 500-200 MG-UNIT TABS Take by mouth (Patient not taking: Reported on 2/6/2023)      naloxone 4 MG/0.1ML LIQD nasal spray ADMINISTER A SINGLE SPRAY IN ONE NOSTRIL UPON SIGNS OF OPIOID OVERDOSE. CALL 911. REPEAT AFTER 3 MINUTES IF NO RESPONSE. (Patient not taking: Reported on 2/6/2023)       No current facility-administered medications for this visit. OBJECTIVE:  /63 (Site: Right Upper Arm, Position: Sitting, Cuff Size: Medium Adult)   Pulse 82   Temp 98.2 °F (36.8 °C) (Oral)   Resp 16   Ht 5' 4\" (1.626 m)   Wt 169 lb 4.8 oz (76.8 kg)   SpO2 96%   BMI 29.06 kg/m²     Physical Exam:  Constitutional: Well developed, well nourished female in no acute distress, sitting comfortably in the exam room chair. HEENT: Normocephalic and atraumatic. Sclerae anicteric. Neck supple without JVD. No thyromegaly present. Lymph node   Deferred. Skin Warm and dry. No bruising and no rash noted. No erythema. No pallor. Respiratory Lungs are clear to auscultation bilaterally without wheezes, rales or rhonchi, normal air exchange without accessory muscle use. CVS Normal rate, regular rhythm and normal S1 and S2. No murmurs, gallops, or rubs. Abdomen Soft, nontender and nondistended, normoactive bowel sounds. Neuro Grossly nonfocal with no obvious sensory or motor deficits.    MSK Normal range of motion in general.  No edema and no tenderness. Psych Appropriate mood and affect. Labs:  Recent Results (from the past 96 hour(s))   CBC with Auto Differential    Collection Time: 02/06/23  1:18 PM   Result Value Ref Range    WBC 7.5 4.3 - 11.1 K/uL    RBC 4.32 4.05 - 5.2 M/uL    Hemoglobin 13.6 11.7 - 15.4 g/dL    Hematocrit 40.7 35.8 - 46.3 %    MCV 94.2 82.0 - 102.0 FL    MCH 31.5 26.1 - 32.9 PG    MCHC 33.4 31.4 - 35.0 g/dL    RDW 12.0 11.9 - 14.6 %    Platelets 491 185 - 996 K/uL    MPV 9.6 9.4 - 12.3 FL    nRBC 0.00 0.0 - 0.2 K/uL    Seg Neutrophils 57 43 - 78 %    Lymphocytes 28 13 - 44 %    Monocytes 12 4.0 - 12.0 %    Eosinophils % 2 0.5 - 7.8 %    Basophils 1 0.0 - 2.0 %    Immature Granulocytes 0 0.0 - 5.0 %    Segs Absolute 4.3 1.7 - 8.2 K/UL    Absolute Lymph # 2.1 0.5 - 4.6 K/UL    Absolute Mono # 0.9 0.1 - 1.3 K/UL    Absolute Eos # 0.1 0.0 - 0.8 K/UL    Basophils Absolute 0.0 0.0 - 0.2 K/UL    Absolute Immature Granulocyte 0.0 0.0 - 0.5 K/UL    Differential Type AUTOMATED           Imaging:  Vascular duplex lower extremity venous left    Result Date: 7/6/2022  1. No evidence for left lower extremity DVT. ASSESSMENT:   Diagnosis Orders   1. Deep vein thrombosis (DVT) of proximal lower extremity, unspecified chronicity, unspecified laterality ([facility] [address]                PLAN:  Lab studies were personally reviewed. VTE: with DVT diagnosed March 2022, small RLL subsegmental PE as well, possibly instigated by COVID infection. No personal or family history of VTE. Eliquis for therapy, completed >6 months of treatment with no evidence of thrombophilia. She returns today for routine follow up. She is doing well off Eliquis, no recurrence of LE symptoms or pulmonary symptoms. Labs reviewed and unremarkable. We will continue off anticoagulation indefinitely, unless she were to have a recurrent VTE, in which case we would recommend lifelong AC. Alarm symptoms were reviewed and she was instructed to call with any concerns. Return to primary care for follow up and with hematology PRN. All questions were asked and answered to the best of my ability.            Moni Baker MD, MD  [facility] Hematology and Oncology  [address] [place] [address]  Office : [phone]  Fax : [phone]

## 2023-02-06 NOTE — PATIENT INSTRUCTIONS
Patient Instructions from Today's Visit    Reason for Visit:  Follow up Deep Vein Thrombosis    Plan:  Discussed stopping blood thinner     Follow Up:  As needed/if a new clot is noted     Recent Lab Results:  Hospital Outpatient Visit on 02/06/2023   Component Date Value Ref Range Status    WBC 02/06/2023 7.5  4.3 - 11.1 K/uL Final    RBC 02/06/2023 4.32  4.05 - 5.2 M/uL Final    Hemoglobin 02/06/2023 13.6  11.7 - 15.4 g/dL Final    Hematocrit 02/06/2023 40.7  35.8 - 46.3 % Final    MCV 02/06/2023 94.2  82.0 - 102.0 FL Final    MCH 02/06/2023 31.5  26.1 - 32.9 PG Final    MCHC 02/06/2023 33.4  31.4 - 35.0 g/dL Final    RDW 02/06/2023 12.0  11.9 - 14.6 % Final    Platelets 14/31/8638 213  150 - 450 K/uL Final    MPV 02/06/2023 9.6  9.4 - 12.3 FL Final    nRBC 02/06/2023 0.00  0.0 - 0.2 K/uL Final    **Note: Absolute NRBC parameter is now reported with Hemogram**    Seg Neutrophils 02/06/2023 57  43 - 78 % Final    Lymphocytes 02/06/2023 28  13 - 44 % Final    Monocytes 02/06/2023 12  4.0 - 12.0 % Final    Eosinophils % 02/06/2023 2  0.5 - 7.8 % Final    Basophils 02/06/2023 1  0.0 - 2.0 % Final    Immature Granulocytes 02/06/2023 0  0.0 - 5.0 % Final    Segs Absolute 02/06/2023 4.3  1.7 - 8.2 K/UL Final    Absolute Lymph # 02/06/2023 2.1  0.5 - 4.6 K/UL Final    Absolute Mono # 02/06/2023 0.9  0.1 - 1.3 K/UL Final    Absolute Eos # 02/06/2023 0.1  0.0 - 0.8 K/UL Final    Basophils Absolute 02/06/2023 0.0  0.0 - 0.2 K/UL Final    Absolute Immature Granulocyte 02/06/2023 0.0  0.0 - 0.5 K/UL Final    Differential Type 02/06/2023 AUTOMATED    Final       Treatment Summary has been discussed and given to patient: n/a    -------------------------------------------------------------------------------------------------------------------  Please call our office at [phone] if you have any  of the following symptoms:   Fever of 100.5 or greater  Chills  Shortness of breath  Swelling or pain in one leg    After office hours an answering service is available and will contact a provider for emergencies or if you are experiencing any of the above symptoms. Patient does express an interest in My Chart. My Chart log in information explained on the after visit summary printout at the [facility] [address] desk.     Tutu Robins RN

## 2023-03-03 ENCOUNTER — TELEPHONE (OUTPATIENT)
Dept: UROLOGY | Age: 73
End: 2023-03-03

## 2023-03-15 DIAGNOSIS — E03.9 ACQUIRED HYPOTHYROIDISM: ICD-10-CM

## 2023-03-16 RX ORDER — LEVOTHYROXINE SODIUM 0.05 MG/1
50 TABLET ORAL DAILY
Qty: 90 TABLET | Refills: 3 | Status: SHIPPED | OUTPATIENT
Start: 2023-03-16

## 2023-03-30 ENCOUNTER — TELEPHONE (OUTPATIENT)
Dept: FAMILY MEDICINE CLINIC | Facility: CLINIC | Age: 73
End: 2023-03-30

## 2023-07-05 ENCOUNTER — OFFICE VISIT (OUTPATIENT)
Dept: FAMILY MEDICINE CLINIC | Facility: CLINIC | Age: 73
End: 2023-07-05
Payer: MEDICARE

## 2023-07-05 VITALS
WEIGHT: 173 LBS | HEIGHT: 64 IN | DIASTOLIC BLOOD PRESSURE: 84 MMHG | SYSTOLIC BLOOD PRESSURE: 126 MMHG | BODY MASS INDEX: 29.53 KG/M2

## 2023-07-05 DIAGNOSIS — Z00.00 ROUTINE GENERAL MEDICAL EXAMINATION AT A HEALTH CARE FACILITY: Primary | ICD-10-CM

## 2023-07-05 DIAGNOSIS — G25.81 RESTLESS LEG: ICD-10-CM

## 2023-07-05 DIAGNOSIS — Z00.00 MEDICARE ANNUAL WELLNESS VISIT, SUBSEQUENT: ICD-10-CM

## 2023-07-05 DIAGNOSIS — R30.0 DYSURIA: ICD-10-CM

## 2023-07-05 DIAGNOSIS — Z13.31 SCREENING FOR DEPRESSION: ICD-10-CM

## 2023-07-05 DIAGNOSIS — L30.9 DERMATITIS: ICD-10-CM

## 2023-07-05 DIAGNOSIS — N39.3 STRESS INCONTINENCE OF URINE: ICD-10-CM

## 2023-07-05 DIAGNOSIS — K21.9 GASTROESOPHAGEAL REFLUX DISEASE WITHOUT ESOPHAGITIS: ICD-10-CM

## 2023-07-05 DIAGNOSIS — E03.9 ACQUIRED HYPOTHYROIDISM: ICD-10-CM

## 2023-07-05 DIAGNOSIS — F41.9 ANXIETY: ICD-10-CM

## 2023-07-05 PROCEDURE — 1123F ACP DISCUSS/DSCN MKR DOCD: CPT | Performed by: FAMILY MEDICINE

## 2023-07-05 PROCEDURE — G0439 PPPS, SUBSEQ VISIT: HCPCS | Performed by: FAMILY MEDICINE

## 2023-07-05 PROCEDURE — 3017F COLORECTAL CA SCREEN DOC REV: CPT | Performed by: FAMILY MEDICINE

## 2023-07-05 RX ORDER — DIAZEPAM 10 MG/1
10 TABLET ORAL 2 TIMES DAILY
Qty: 30 TABLET | Refills: 5 | Status: SHIPPED | OUTPATIENT
Start: 2023-07-05 | End: 2023-10-03

## 2023-07-05 RX ORDER — ROPINIROLE 1 MG/1
1 TABLET, FILM COATED ORAL NIGHTLY
Qty: 90 TABLET | Refills: 3 | Status: SHIPPED | OUTPATIENT
Start: 2023-07-05

## 2023-07-05 RX ORDER — LEVOTHYROXINE SODIUM 0.05 MG/1
50 TABLET ORAL DAILY
Qty: 90 TABLET | Refills: 3 | Status: SHIPPED | OUTPATIENT
Start: 2023-07-05

## 2023-07-05 RX ORDER — CITALOPRAM 40 MG/1
40 TABLET ORAL DAILY
Qty: 90 TABLET | Refills: 3 | Status: SHIPPED | OUTPATIENT
Start: 2023-07-05

## 2023-07-05 RX ORDER — OXYBUTYNIN CHLORIDE 15 MG/1
15 TABLET, EXTENDED RELEASE ORAL DAILY
Qty: 90 TABLET | Refills: 1 | Status: SHIPPED | OUTPATIENT
Start: 2023-07-05

## 2023-07-05 RX ORDER — PANTOPRAZOLE SODIUM 40 MG/1
40 TABLET, DELAYED RELEASE ORAL DAILY
Qty: 90 TABLET | Refills: 3 | Status: SHIPPED | OUTPATIENT
Start: 2023-07-05

## 2023-07-05 RX ORDER — TRIAMCINOLONE ACETONIDE 1 MG/G
CREAM TOPICAL
Qty: 30 G | Refills: 5 | Status: SHIPPED | OUTPATIENT
Start: 2023-07-05

## 2023-07-05 SDOH — ECONOMIC STABILITY: FOOD INSECURITY: WITHIN THE PAST 12 MONTHS, YOU WORRIED THAT YOUR FOOD WOULD RUN OUT BEFORE YOU GOT MONEY TO BUY MORE.: NEVER TRUE

## 2023-07-05 SDOH — ECONOMIC STABILITY: INCOME INSECURITY: HOW HARD IS IT FOR YOU TO PAY FOR THE VERY BASICS LIKE FOOD, HOUSING, MEDICAL CARE, AND HEATING?: NOT HARD AT ALL

## 2023-07-05 SDOH — ECONOMIC STABILITY: HOUSING INSECURITY
IN THE LAST 12 MONTHS, WAS THERE A TIME WHEN YOU DID NOT HAVE A STEADY PLACE TO SLEEP OR SLEPT IN A SHELTER (INCLUDING NOW)?: NO

## 2023-07-05 SDOH — ECONOMIC STABILITY: FOOD INSECURITY: WITHIN THE PAST 12 MONTHS, THE FOOD YOU BOUGHT JUST DIDN'T LAST AND YOU DIDN'T HAVE MONEY TO GET MORE.: NEVER TRUE

## 2023-07-05 ASSESSMENT — PATIENT HEALTH QUESTIONNAIRE - PHQ9
SUM OF ALL RESPONSES TO PHQ QUESTIONS 1-9: 4
7. TROUBLE CONCENTRATING ON THINGS, SUCH AS READING THE NEWSPAPER OR WATCHING TELEVISION: 0
SUM OF ALL RESPONSES TO PHQ QUESTIONS 1-9: 4
3. TROUBLE FALLING OR STAYING ASLEEP: 0
9. THOUGHTS THAT YOU WOULD BE BETTER OFF DEAD, OR OF HURTING YOURSELF: 0
6. FEELING BAD ABOUT YOURSELF - OR THAT YOU ARE A FAILURE OR HAVE LET YOURSELF OR YOUR FAMILY DOWN: 0
8. MOVING OR SPEAKING SO SLOWLY THAT OTHER PEOPLE COULD HAVE NOTICED. OR THE OPPOSITE, BEING SO FIGETY OR RESTLESS THAT YOU HAVE BEEN MOVING AROUND A LOT MORE THAN USUAL: 0
4. FEELING TIRED OR HAVING LITTLE ENERGY: 1
1. LITTLE INTEREST OR PLEASURE IN DOING THINGS: 1
10. IF YOU CHECKED OFF ANY PROBLEMS, HOW DIFFICULT HAVE THESE PROBLEMS MADE IT FOR YOU TO DO YOUR WORK, TAKE CARE OF THINGS AT HOME, OR GET ALONG WITH OTHER PEOPLE: 0
5. POOR APPETITE OR OVEREATING: 1
SUM OF ALL RESPONSES TO PHQ9 QUESTIONS 1 & 2: 2
2. FEELING DOWN, DEPRESSED OR HOPELESS: 1

## 2023-07-05 NOTE — PROGRESS NOTES
Medicare Annual Wellness Visit    Daria Rodrigues is here for [facility] AWV    Assessment & Plan   Routine general medical examination at a health care facility  Restless leg  -     rOPINIRole (REQUIP) 1 MG tablet; Take 1 tablet by mouth nightly, Disp-90 tablet, R-3Normal  Gastroesophageal reflux disease without esophagitis  -     pantoprazole (PROTONIX) 40 MG tablet; Take 1 tablet by mouth daily, Disp-90 tablet, R-3Normal  Stress incontinence of urine  -     oxybutynin (DITROPAN XL) 15 MG extended release tablet; Take 1 tablet by mouth daily, Disp-90 tablet, R-1Normal  -     [facility] - Disha Holland DO, Urogynecology, [place]  Acquired hypothyroidism  -     levothyroxine (SYNTHROID) 50 MCG tablet; Take 1 tablet by mouth Daily TAKE 1 TABLET BY MOUTH DAILY (BEFORE BREAKFAST) FOR HYPOTHYROIDISM, Disp-90 tablet, R-3Normal  Anxiety  -     diazePAM (VALIUM) 10 MG tablet; Take 1 tablet by mouth 2 times daily for 90 days. Max Daily Amount: 20 mg, Disp-30 tablet, R-5Normal  -     citalopram (CELEXA) 40 MG tablet; Take 1 tablet by mouth daily, Disp-90 tablet, R-3Normal  Dysuria  -     AMB POC URINALYSIS DIP STICK AUTO W/O MICRO  Dermatitis  -     triamcinolone (KENALOG) 0.1 % cream; Apply on affected areas twice a day, Disp-30 g, R-5, Normal  Screening for depression  Medicare annual wellness visit, subsequent    Recommendations for Preventive Services Due: see orders and patient instructions/AVS.  Recommended screening schedule for the next 5-10 years is provided to the patient in written form: see Patient Instructions/AVS.     No follow-ups on file. Subjective   Patient presents to the office today for a Medicare wellness/physical complaining of dysuria but we have no labs today patient also wants to have a urology referral for her urinary incontinence    Patient's complete Health Risk Assessment and screening values have been reviewed and are found in Flowsheets.  The following problems were reviewed today and where

## 2023-07-07 DIAGNOSIS — Z00.00 ROUTINE GENERAL MEDICAL EXAMINATION AT A HEALTH CARE FACILITY: ICD-10-CM

## 2023-07-07 LAB
ALBUMIN SERPL-MCNC: 3.9 G/DL (ref 3.2–4.6)
ALBUMIN/GLOB SERPL: 1.2 (ref 0.4–1.6)
ALP SERPL-CCNC: 84 U/L (ref 50–136)
ALT SERPL-CCNC: 36 U/L (ref 12–65)
ANION GAP SERPL CALC-SCNC: 6 MMOL/L (ref 2–11)
AST SERPL-CCNC: 32 U/L (ref 15–37)
BILIRUB SERPL-MCNC: 1 MG/DL (ref 0.2–1.1)
BUN SERPL-MCNC: 14 MG/DL (ref 8–23)
CALCIUM SERPL-MCNC: 9.3 MG/DL (ref 8.3–10.4)
CHLORIDE SERPL-SCNC: 107 MMOL/L (ref 101–110)
CHOLEST SERPL-MCNC: 166 MG/DL
CO2 SERPL-SCNC: 26 MMOL/L (ref 21–32)
CREAT SERPL-MCNC: 1.3 MG/DL (ref 0.6–1)
GLOBULIN SER CALC-MCNC: 3.3 G/DL (ref 2.8–4.5)
GLUCOSE SERPL-MCNC: 107 MG/DL (ref 65–100)
HDLC SERPL-MCNC: 46 MG/DL (ref 40–60)
HDLC SERPL: 3.6
LDLC SERPL CALC-MCNC: 84.8 MG/DL
POTASSIUM SERPL-SCNC: 4.3 MMOL/L (ref 3.5–5.1)
PROT SERPL-MCNC: 7.2 G/DL (ref 6.3–8.2)
SODIUM SERPL-SCNC: 139 MMOL/L (ref 133–143)
TRIGL SERPL-MCNC: 176 MG/DL (ref 35–150)
VLDLC SERPL CALC-MCNC: 35.2 MG/DL (ref 6–23)

## 2023-08-17 ENCOUNTER — OFFICE VISIT (OUTPATIENT)
Dept: UROGYNECOLOGY | Age: 73
End: 2023-08-17

## 2023-08-17 VITALS — WEIGHT: 168 LBS | HEIGHT: 64 IN | BODY MASS INDEX: 28.68 KG/M2

## 2023-08-17 DIAGNOSIS — N39.3 STRESS INCONTINENCE, FEMALE: Primary | ICD-10-CM

## 2023-08-17 DIAGNOSIS — N39.41 URGE INCONTINENCE: ICD-10-CM

## 2023-08-17 LAB
BILIRUBIN, URINE, POC: NEGATIVE
BLOOD URINE, POC: NORMAL
GLUCOSE URINE, POC: NEGATIVE
KETONES, URINE, POC: NORMAL
LEUKOCYTE ESTERASE, URINE, POC: NEGATIVE
NITRITE, URINE, POC: NEGATIVE
PH, URINE, POC: 5.5 (ref 4.6–8)
PROTEIN,URINE, POC: NEGATIVE
SPECIFIC GRAVITY, URINE, POC: >=1.03 (ref 1–1.03)
URINALYSIS CLARITY, POC: CLEAR
URINALYSIS COLOR, POC: YELLOW
UROBILINOGEN, POC: NORMAL

## 2023-08-17 NOTE — ASSESSMENT & PLAN NOTE
We discussed the differential diagnosis of overactive bladder. We discussed the epidemiology, pathogenesis, and etiology of bladder overactivity including the neurophysiologic axis. We also discussed the treatment pathway for OAB. I offered options which include nothing, medications, physical therapy, behavior modification, and neuromodulation. 1. Eliminate bladder irritants  2. Physical therapy: We discussed the purpose of physical therapy which is to strengthen the pelvic floor muscles and teach proper coordination of those muscles. I described the anatomy of those muscles involved and their relationship to the end-organs in the pelvis. I described therapy techniques which include a combination of therapeutic exercise, biofeedback, neuromuscular re-education, home programs, and electrical stimulation, as well as therapeutic massage and ultrasound for pain.

## 2023-08-17 NOTE — ASSESSMENT & PLAN NOTE
We discussed the differential diagnosis of urinary incontinence. We also discussed the pathogenesis and etiology of stress urinary incontinence. I explained the epidemiology of incontinence. I offered her options which include nothing, physical therapy, barrier treatment, and surgery.      UUI> ROSITA so we will treat UUI first

## 2023-08-17 NOTE — ASSESSMENT & PLAN NOTE
We discussed the differential diagnosis of urinary incontinence. We also discussed the pathogenesis and etiology of stress urinary incontinence. I explained the epidemiology of incontinence. I offered her options which include nothing, physical therapy, barrier treatment, and surgery.      UUI> ROSITA so we will treat the UUI first.

## 2023-08-17 NOTE — PROGRESS NOTES
production at night) by release of atrial natriuretic peptide (ANP) due to negative intrathoracic pressure and stretching of the myocardium This results in increased sodium and water excretion. CPAP can result in significant reductions in ANP levels and in nocturia episodes. Return in about 3 months (around 11/17/2023). On this date 8/17/2023 I have spent 48 minutes reviewing previous notes, test results and face to face with the patient discussing the diagnosis and importance of compliance with the treatment plan as well as documenting on the day of the visit.     Kevin Clark, DO

## 2023-10-23 ENCOUNTER — NURSE TRIAGE (OUTPATIENT)
Dept: OTHER | Facility: CLINIC | Age: 73
End: 2023-10-23

## 2023-10-23 NOTE — TELEPHONE ENCOUNTER
Location of patient [id number] [address]     Received call from Gonzalez Huertas at [employer] with [employer]. Subjective: Caller states she has had facial swelling underneath left eye. Mild and some itching. Pt states she believes she has fungal infection from  who gets at time and has cancer. Pt states she has had swelling in the face in the past but states it was not the same. Current Symptoms: Facial swelling     Onset: 5 days ago;     Associated Symptoms: NA    Pain Severity: 0/10; N/A; none    Temperature: Denies      What has been tried: Unknown    LMP: NA Pregnant: NA    Recommended disposition: See PCP within 3 Days    Care advice provided, patient verbalizes understanding; denies any other questions or concerns; instructed to call back for any new or worsening symptoms. Patient/Caller agrees with recommended disposition; writer provided warm transfer to [employer] at [employer] for appointment scheduling    Attention Provider: Thank you for allowing me to participate in the care of your patient. The patient was connected to triage in response to information provided to the ECC/PSC. Please do not respond through this encounter as the response is not directed to a shared pool. Reason for Disposition   [1] Mild face swelling (puffiness) AND [2] persists > 3 days    Protocols used:  Face Swelling-ADULT-AH

## 2023-10-24 ENCOUNTER — TELEPHONE (OUTPATIENT)
Dept: FAMILY MEDICINE CLINIC | Facility: CLINIC | Age: 73
End: 2023-10-24

## 2023-10-24 NOTE — TELEPHONE ENCOUNTER
I called patient , per Dr Clarissa Crawford patient advised to go to Urgent care  today , Dr Clarissa Crawford is not in the office tomorrow patient states she feel better she will wait until tomorrow

## 2023-12-29 ENCOUNTER — TELEPHONE (OUTPATIENT)
Dept: FAMILY MEDICINE CLINIC | Facility: CLINIC | Age: 73
End: 2023-12-29

## 2023-12-29 NOTE — TELEPHONE ENCOUNTER
Patient requesting re-newed referral to PT. This was ordered on Aug. 17th, but she wasn't able to go due to her  being diagnostis with CA. Patient is still having the same sxs. Can a new referral be placed? She said it's .

## 2024-01-02 ENCOUNTER — TELEPHONE (OUTPATIENT)
Dept: FAMILY MEDICINE CLINIC | Facility: CLINIC | Age: 74
End: 2024-01-02

## 2024-01-02 DIAGNOSIS — N39.3 STRESS INCONTINENCE: Primary | ICD-10-CM

## 2024-02-08 NOTE — THERAPY EVALUATION
Desiree Goldsmith  : 1950  Primary: [employer] (Medicare Managed)  Secondary:  [facility] @ [address]  [facility] [address]  Phone: [phone]  Fax: [phone] Plan Frequency: 1x/every 3 weeks    Plan of Care/Certification Expiration Date: 24        Plan of Care/Certification Expiration Date:  Plan of Care/Certification Expiration Date: 24    Frequency/Duration: Plan Frequency: 1x/every 3 weeks      Time In/Out:   Time In: 1402  Time Out: 1457    PT Visit Info:    Plan Frequency: 1x/every 3 weeks  Progress Note Due Date: 24      Visit Count:  1                OUTPATIENT PHYSICAL THERAPY:             Initial Assessment 2024                 Episode (PFPT)         Treatment Diagnosis:     Lack of coordination  Mixed incontinence  Contributing Diagnosis:  Constipation, unspecified (K59.00), Pelvic floor dysfunction in female (M62.98), and Urgency of urination (R39.15)  Medical/Referring Diagnosis:    Stress incontinence      Referring Physician:  Eliud Ambriz DO MD Orders:  PT Eval and Treat   Return MD Appt:    Date of Onset:      Allergies:  Hydrocodone-acetaminophen, Thimerosal (thiomersal), Adhesive tape, and Codeine  Restrictions/Precautions:    None      Medications Last Reviewed:  2024     SUBJECTIVE   History of Injury/Illness (Reason for Referral):  Ms. Goldsmith is a 74 yo female referred to pelvic floor physical therapy (PFPT) by Eliud Ambriz DO 2/2 Stress incontinence [N39.3].    Patient reports that she is having urinary incontinence, she reports that she has urgency and leakage with this. She reports she probably leaks with coughing sneezing and laughing.   Urinary leakage started a long time ago. She reports it is actually a bit better than it was. She reports that she had her initial visit with PFPT and then had to stop awhile ago.     Exercise routine: none  Chronic back pain

## 2024-02-09 NOTE — PROGRESS NOTES
Desiree Rene Agus  : 1950  Primary: [employer] (Medicare Managed)  Secondary:  [facility] @ [address]  [facility] [address]  Phone: [phone]  Fax: [phone] Plan Frequency: 1x/every 3 weeks  Plan of Care/Certification Expiration Date: 24        Plan of Care/Certification Expiration Date:  Plan of Care/Certification Expiration Date: 24    Frequency/Duration: Plan Frequency: 1x/every 3 weeks      Time In/Out:   Time In: 1402  Time Out: 1457      PT Visit Info:    Progress Note Due Date: 24      Visit Count:  1    OUTPATIENT PHYSICAL THERAPY:   Treatment Note 2024       Episode  (PFPT)               Treatment Diagnosis:    Lack of coordination  Mixed incontinence  Medical/Referring Diagnosis:    Stress incontinence    Referring Physician:  Eliud Ambriz DO MD Orders:  PT Eval and Treat   Return MD Appt:     Date of Onset:  No data recorded   Allergies:   Hydrocodone-acetaminophen, Thimerosal (thiomersal), Adhesive tape, and Codeine  Restrictions/Precautions:   None      Interventions Planned (Treatment may consist of any combination of the following):     See Assessment Note    Subjective Comments:     Initial Pain Level::     0/10  Post Session Pain Level:       0/10  Medications Last Reviewed:  2024  Updated Objective Findings:  See Evaluation Note from today  Treatment   THERAPEUTIC ACTIVITY: ( 25 minutes): Functional activity education regarding anatomy, pathology and role of pelvic floor muscle (PFM) function in relation to presenting symptoms and role of pelvic floor therapy in conservative treatment., Functional activity education on avoiding bladder irritants, substitutions for common irritants, recommended fluid intake (types and spacing), appropriate voiding frequency, weight management and overall contributing factors of bowel health on bladder health/function in order to improve independent self

## 2024-02-12 ENCOUNTER — HOSPITAL ENCOUNTER (OUTPATIENT)
Dept: PHYSICAL THERAPY | Age: 74
Setting detail: RECURRING SERIES
Discharge: HOME | End: 2024-02-15
Attending: FAMILY MEDICINE
Payer: MEDICARE

## 2024-02-12 DIAGNOSIS — N39.46 MIXED INCONTINENCE: ICD-10-CM

## 2024-02-12 DIAGNOSIS — R27.9 LACK OF COORDINATION: Primary | ICD-10-CM

## 2024-02-12 PROCEDURE — 97161 PT EVAL LOW COMPLEX 20 MIN: CPT

## 2024-02-12 PROCEDURE — 97530 THERAPEUTIC ACTIVITIES: CPT

## 2024-02-14 ENCOUNTER — OFFICE VISIT (OUTPATIENT)
Dept: OBGYN CLINIC | Age: 74
End: 2024-02-14
Payer: MEDICARE

## 2024-02-14 ENCOUNTER — TELEPHONE (OUTPATIENT)
Dept: OBGYN CLINIC | Age: 74
End: 2024-02-14

## 2024-02-14 VITALS
WEIGHT: 166 LBS | DIASTOLIC BLOOD PRESSURE: 74 MMHG | BODY MASS INDEX: 28.34 KG/M2 | HEIGHT: 64 IN | SYSTOLIC BLOOD PRESSURE: 112 MMHG

## 2024-02-14 DIAGNOSIS — N95.2 VAGINAL ATROPHY: Primary | ICD-10-CM

## 2024-02-14 DIAGNOSIS — L24.A2 IRRITANT CONTACT DERMATITIS DUE TO INCONTINENCE OF BOTH FECES AND URINE: ICD-10-CM

## 2024-02-14 DIAGNOSIS — Z86.718 HISTORY OF VENOUS THROMBOEMBOLISM: ICD-10-CM

## 2024-02-14 PROCEDURE — G8484 FLU IMMUNIZE NO ADMIN: HCPCS | Performed by: OBSTETRICS & GYNECOLOGY

## 2024-02-14 PROCEDURE — 0509F URINE INCON PLAN DOCD: CPT | Performed by: OBSTETRICS & GYNECOLOGY

## 2024-02-14 PROCEDURE — 1123F ACP DISCUSS/DSCN MKR DOCD: CPT | Performed by: OBSTETRICS & GYNECOLOGY

## 2024-02-14 PROCEDURE — G8400 PT W/DXA NO RESULTS DOC: HCPCS | Performed by: OBSTETRICS & GYNECOLOGY

## 2024-02-14 PROCEDURE — 1036F TOBACCO NON-USER: CPT | Performed by: OBSTETRICS & GYNECOLOGY

## 2024-02-14 PROCEDURE — 1090F PRES/ABSN URINE INCON ASSESS: CPT | Performed by: OBSTETRICS & GYNECOLOGY

## 2024-02-14 PROCEDURE — G8427 DOCREV CUR MEDS BY ELIG CLIN: HCPCS | Performed by: OBSTETRICS & GYNECOLOGY

## 2024-02-14 PROCEDURE — 99203 OFFICE O/P NEW LOW 30 MIN: CPT | Performed by: OBSTETRICS & GYNECOLOGY

## 2024-02-14 PROCEDURE — 3017F COLORECTAL CA SCREEN DOC REV: CPT | Performed by: OBSTETRICS & GYNECOLOGY

## 2024-02-14 PROCEDURE — G8417 CALC BMI ABV UP PARAM F/U: HCPCS | Performed by: OBSTETRICS & GYNECOLOGY

## 2024-02-14 RX ORDER — CHOLESTYRAMINE 4 G/9G
POWDER, FOR SUSPENSION ORAL
COMMUNITY
Start: 2024-01-05

## 2024-02-14 RX ORDER — MINOXIDIL 2.5 MG/1
TABLET ORAL
COMMUNITY
Start: 2023-12-18

## 2024-02-14 RX ORDER — DIAZEPAM 10 MG/1
TABLET ORAL
COMMUNITY
Start: 2024-01-10

## 2024-02-14 RX ORDER — RISPERIDONE 1 MG/1
1 TABLET ORAL 2 TIMES DAILY
COMMUNITY
Start: 2024-01-10

## 2024-02-14 RX ORDER — ESTRADIOL 0.1 MG/G
CREAM VAGINAL
Qty: 42.5 G | Refills: 0 | Status: SHIPPED | OUTPATIENT
Start: 2024-02-14

## 2024-02-14 RX ORDER — ESTRADIOL 0.1 MG/G
CREAM VAGINAL
Qty: 42.5 G | Refills: 5 | Status: SHIPPED | OUTPATIENT
Start: 2024-02-14

## 2024-02-14 NOTE — PROGRESS NOTES
[facility]  [address], [place] [address]  [phone]    Stephani Fay MD, FACOG  Jay Jaffe MD, FACOG  MODESTO Hale-BC  Assessment/Plan     Desiree was seen today for new patient.    Diagnoses and all orders for this visit:    Vaginal atrophy  Comments:  - recommend vaginal estrogen cream  - local and not systemic absortion (h/o VTE but does not inc risk)  - should help with caruncle and dryness with sex  Orders:  -     estradiol (ESTRACE VAGINAL) 0.1 MG/GM vaginal cream; Insert 1/4 applicator (0.5 g)  vaginally qhs x 2 weeks. Apply small pea-sized amount to vulva. After initial 2 weeks, insert and apply cream 2 times a week    Irritant contact dermatitis due to incontinence of both feces and urine  Comments:  - likey erythema due to contact dermatitis from pad/urine/fecal material  - recommend barrier cream (A&D ointment, vaseline etc)  - she is working with PT    History of venous thromboembolism  Comments:  - occured during covid infection  - no longer on anticoagulation  - discussed estrogen vaginally is small amount with local and not systemic absorption    BMI 28.0-28.9,adult  Comments:  - healthy diet and exercise encouraged           Subjective     Desiree Goldsmith 73 y.o.  presents today for a gyn problem of vulvar irritation and redness. New patient. She has a h/o mixed urinary incontinence and fecal incontinence. Seen by Dr Holland with urogyn and has been seen by PT. Reports she leaks urine without realizing it, mult times a day, wears a pad. PT did not want to do any massage when seen on 24 due to erythema she had. Fecal incontinence 2x a week and she is unaware when it happens. No h/o DM. No bleeding or discharge.     Menopausal, did HRT for a short time after menopause but had bleeding and stopped. She is sexually active. Dryness with sex, no pain/bleeding. Vulvar discomfort has been ongoing x 6 months but not severe. Didn't really notice much of a

## 2024-02-14 NOTE — TELEPHONE ENCOUNTER
I sent it to the pharmacy at 10:25 this am. Looks like it went to the send out pharmacy ([facility]). Just sent another one to [facility]. Rx was an estrogen vaginal cream.

## 2024-02-14 NOTE — PROGRESS NOTES
Chaperone for Intimate Exam     Chaperone was offer accepted as part of the rooming process    Chaperone: Maru Sommers

## 2024-02-14 NOTE — PROGRESS NOTES
Pt comes in today as new pt for vaginal redness, soreness. Pt states this has been going on for a while. Pt states she tried doing pelvic floor PT but they were unable to do anything due to the redness. Pt has also been dealing w/ urinary incontinence for months.     PT LAST AE W TIAGO OM 09/25/2023     LAST PAP:  09/25/2023 negative negative     LAST MAMMO:  05/10/2023    LMP:  No LMP recorded. Patient is postmenopausal.    BIRTH CONTROL:  post menopausal status    TOBACCO USE:  No    FAMILY HISTORY OF:   Breast Cancer:  Yes   Ovarian Cancer:  No   Uterine Cancer:  No   Colon Cancer:  No    Vitals:    02/14/24 0945   BP: 112/74   Site: Left Upper Arm   Position: Sitting   Weight: 75.3 kg (166 lb)   Height: 1.626 m (5' 4\")        Torie Christianson MA  02/14/24  9:57 AM

## 2024-02-14 NOTE — TELEPHONE ENCOUNTER
Patient  LM stating patient was suppose to get a rx. However, when they went to pharmacy the rx was not there.        Dr. Fay,    Patient  would like to  the rx tomorrow.

## 2024-05-17 RX ORDER — ESTRADIOL 0.1 MG/G
CREAM VAGINAL
Qty: 42.5 G | Refills: 0 | OUTPATIENT
Start: 2024-05-17

## 2024-08-22 DIAGNOSIS — K21.9 GASTROESOPHAGEAL REFLUX DISEASE WITHOUT ESOPHAGITIS: ICD-10-CM

## 2024-08-22 DIAGNOSIS — E03.9 ACQUIRED HYPOTHYROIDISM: ICD-10-CM

## 2024-08-23 RX ORDER — PANTOPRAZOLE SODIUM 40 MG/1
40 TABLET, DELAYED RELEASE ORAL DAILY
Qty: 90 TABLET | Refills: 3 | OUTPATIENT
Start: 2024-08-23

## 2024-08-23 RX ORDER — LEVOTHYROXINE SODIUM 50 UG/1
TABLET ORAL
Qty: 90 TABLET | Refills: 3 | OUTPATIENT
Start: 2024-08-23

## 2024-09-03 DIAGNOSIS — E03.9 ACQUIRED HYPOTHYROIDISM: ICD-10-CM

## 2024-09-03 RX ORDER — LEVOTHYROXINE SODIUM 50 UG/1
TABLET ORAL
Qty: 90 TABLET | Refills: 3 | OUTPATIENT
Start: 2024-09-03

## 2024-09-03 NOTE — TELEPHONE ENCOUNTER
----- Message from Guille GARCIA sent at 9/3/2024  1:43 PM EDT -----  Regarding: ECC Appointment Request  ECC Appointment Request    Patient needs appointment for ECC Appointment Type: Annual Visit.    Patient Requested Dates(s): September 23, 2024   Patient Requested Time: 12 noon or 1pm   Provider Name: Eliud Ambriz DO    Reason for Appointment Request: Established Patient - Available appointments did not meet patient need .. Pt wanted to have an AWV.   --------------------------------------------------------------------------------------------------------------------------    Relationship to Patient: Self     Call Back Information: OK to leave message on voicemail  Preferred Call Back Number: Phone : [phone]

## 2024-10-24 ENCOUNTER — OFFICE VISIT (OUTPATIENT)
Dept: FAMILY MEDICINE CLINIC | Facility: CLINIC | Age: 74
End: 2024-10-24

## 2024-10-24 VITALS
DIASTOLIC BLOOD PRESSURE: 64 MMHG | WEIGHT: 161 LBS | BODY MASS INDEX: 27.49 KG/M2 | HEART RATE: 116 BPM | HEIGHT: 64 IN | SYSTOLIC BLOOD PRESSURE: 122 MMHG

## 2024-10-24 DIAGNOSIS — M54.50 CHRONIC MIDLINE LOW BACK PAIN WITHOUT SCIATICA: ICD-10-CM

## 2024-10-24 DIAGNOSIS — N39.3 STRESS INCONTINENCE: Primary | ICD-10-CM

## 2024-10-24 DIAGNOSIS — G89.29 CHRONIC MIDLINE LOW BACK PAIN WITHOUT SCIATICA: ICD-10-CM

## 2024-10-24 RX ORDER — AMANTADINE HYDROCHLORIDE 100 MG/1
100 CAPSULE, GELATIN COATED ORAL DAILY
COMMUNITY
Start: 2024-10-10

## 2024-10-24 ASSESSMENT — ENCOUNTER SYMPTOMS
DIARRHEA: 0
RHINORRHEA: 0
ABDOMINAL PAIN: 0
BACK PAIN: 1
COUGH: 0
SINUS PAIN: 0
SHORTNESS OF BREATH: 0

## 2024-10-24 NOTE — PROGRESS NOTES
PROGRESS NOTE    SUBJECTIVE:   Desiree Goldsmith is a 74 y.o. female seen for a follow up visit regarding the following chief complaint:     Chief Complaint   Patient presents with    Other     Referral urology request. PT is not helping her incontinence.     Per  refused any need for refills.            HPI patient presents the office with her  requesting referrals to see a urologist Dr. Payne as well as wanting to go to spine and pain for her chronic back pain      Past Medical History, Past Surgical History, Family history, Social History, and Medications were all reviewed with the patient today and updated as necessary.       Current Outpatient Medications   Medication Sig Dispense Refill    amantadine (SYMMETREL) 100 MG capsule Take 1 capsule by mouth daily      cholestyramine (QUESTRAN) 4 GM/DOSE powder TAKE 1 TABLESPOON A DAY      minoxidil (LONITEN) 2.5 MG tablet       diazePAM (VALIUM) 10 MG tablet Take half tab po qam and one tab po q evenings.      risperiDONE (RISPERDAL) 1 MG tablet Take 1 tablet by mouth 2 times daily      CPAP Machine MISC       pantoprazole (PROTONIX) 40 MG tablet Take 1 tablet by mouth daily 90 tablet 3    oxybutynin (DITROPAN XL) 15 MG extended release tablet Take 1 tablet by mouth daily 90 tablet 1    levothyroxine (SYNTHROID) 50 MCG tablet Take 1 tablet by mouth Daily TAKE 1 TABLET BY MOUTH DAILY (BEFORE BREAKFAST) FOR HYPOTHYROIDISM 90 tablet 3    citalopram (CELEXA) 40 MG tablet Take 1 tablet by mouth daily 90 tablet 3    triamcinolone (KENALOG) 0.1 % cream Apply on affected areas twice a day 30 g 5    cycloSPORINE (RESTASIS) 0.05 % ophthalmic emulsion       traMADol (ULTRAM) 50 MG tablet TAKE 1 TABLET BY MOUTH 4 TIMES DAILY AS NEEDED FOR PAIN      Calcium Carbonate-Vitamin D (OYSTER SHELL CALCIUM/D) 500-200 MG-UNIT TABS Take by mouth      vitamin D3 (CHOLECALCIFEROL) 10 MCG (400 UNIT) TABS tablet Take by mouth      gabapentin (NEURONTIN) 400 MG capsule Take 1

## 2025-02-17 ENCOUNTER — TELEPHONE (OUTPATIENT)
Dept: FAMILY MEDICINE CLINIC | Facility: CLINIC | Age: 75
End: 2025-02-17

## 2025-02-17 DIAGNOSIS — N39.3 STRESS INCONTINENCE: Primary | ICD-10-CM

## 2025-02-17 NOTE — TELEPHONE ENCOUNTER
Patient states a referral was going to be placed last visit for [place] [facility]. Patient states she has been call and they don't have the referral. Looks like it was noted in last OV 10/24/2024    Please advise if this is still okay to order. thanks

## 2025-03-20 ENCOUNTER — OFFICE VISIT (OUTPATIENT)
Dept: FAMILY MEDICINE CLINIC | Facility: CLINIC | Age: 75
End: 2025-03-20

## 2025-03-20 VITALS
DIASTOLIC BLOOD PRESSURE: 58 MMHG | HEIGHT: 64 IN | SYSTOLIC BLOOD PRESSURE: 92 MMHG | HEART RATE: 68 BPM | WEIGHT: 163 LBS | BODY MASS INDEX: 27.83 KG/M2

## 2025-03-20 DIAGNOSIS — M79.7 FIBROMYALGIA: ICD-10-CM

## 2025-03-20 DIAGNOSIS — M35.9 AUTOIMMUNE DISEASE: ICD-10-CM

## 2025-03-20 DIAGNOSIS — Z13.31 SCREENING FOR DEPRESSION: ICD-10-CM

## 2025-03-20 DIAGNOSIS — Z00.00 MEDICARE ANNUAL WELLNESS VISIT, SUBSEQUENT: Primary | ICD-10-CM

## 2025-03-20 DIAGNOSIS — N39.3 STRESS INCONTINENCE: ICD-10-CM

## 2025-03-20 DIAGNOSIS — M81.6 LOCALIZED OSTEOPOROSIS (LEQUESNE): ICD-10-CM

## 2025-03-20 DIAGNOSIS — F23 BRIEF PSYCHOTIC DISORDER (HCC): ICD-10-CM

## 2025-03-20 DIAGNOSIS — K21.9 GASTROESOPHAGEAL REFLUX DISEASE WITHOUT ESOPHAGITIS: ICD-10-CM

## 2025-03-20 DIAGNOSIS — R53.83 OTHER FATIGUE: ICD-10-CM

## 2025-03-20 DIAGNOSIS — Z00.00 ROUTINE GENERAL MEDICAL EXAMINATION AT A HEALTH CARE FACILITY: ICD-10-CM

## 2025-03-20 DIAGNOSIS — F41.9 ANXIETY: ICD-10-CM

## 2025-03-20 DIAGNOSIS — L30.9 DERMATITIS: ICD-10-CM

## 2025-03-20 DIAGNOSIS — E03.9 ACQUIRED HYPOTHYROIDISM: ICD-10-CM

## 2025-03-20 DIAGNOSIS — R30.0 DYSURIA: ICD-10-CM

## 2025-03-20 DIAGNOSIS — N18.30 STAGE 3 CHRONIC KIDNEY DISEASE, UNSPECIFIED WHETHER STAGE 3A OR 3B CKD (HCC): ICD-10-CM

## 2025-03-20 RX ORDER — PANTOPRAZOLE SODIUM 40 MG/1
40 TABLET, DELAYED RELEASE ORAL DAILY
Qty: 90 TABLET | Refills: 3 | Status: SHIPPED | OUTPATIENT
Start: 2025-03-20

## 2025-03-20 RX ORDER — CITALOPRAM HYDROBROMIDE 40 MG/1
40 TABLET ORAL DAILY
Qty: 90 TABLET | Refills: 3 | Status: SHIPPED | OUTPATIENT
Start: 2025-03-20

## 2025-03-20 RX ORDER — LEVOTHYROXINE SODIUM 50 UG/1
50 TABLET ORAL DAILY
Qty: 90 TABLET | Refills: 3 | Status: SHIPPED | OUTPATIENT
Start: 2025-03-20

## 2025-03-20 RX ORDER — RISPERIDONE 0.5 MG/1
TABLET ORAL
COMMUNITY
Start: 2025-01-07

## 2025-03-20 SDOH — ECONOMIC STABILITY: FOOD INSECURITY: WITHIN THE PAST 12 MONTHS, YOU WORRIED THAT YOUR FOOD WOULD RUN OUT BEFORE YOU GOT MONEY TO BUY MORE.: NEVER TRUE

## 2025-03-20 SDOH — ECONOMIC STABILITY: FOOD INSECURITY: WITHIN THE PAST 12 MONTHS, THE FOOD YOU BOUGHT JUST DIDN'T LAST AND YOU DIDN'T HAVE MONEY TO GET MORE.: NEVER TRUE

## 2025-03-20 ASSESSMENT — PATIENT HEALTH QUESTIONNAIRE - PHQ9
SUM OF ALL RESPONSES TO PHQ QUESTIONS 1-9: 0
5. POOR APPETITE OR OVEREATING: NOT AT ALL
3. TROUBLE FALLING OR STAYING ASLEEP: NOT AT ALL
2. FEELING DOWN, DEPRESSED OR HOPELESS: NOT AT ALL
SUM OF ALL RESPONSES TO PHQ QUESTIONS 1-9: 0
6. FEELING BAD ABOUT YOURSELF - OR THAT YOU ARE A FAILURE OR HAVE LET YOURSELF OR YOUR FAMILY DOWN: NOT AT ALL
1. LITTLE INTEREST OR PLEASURE IN DOING THINGS: NOT AT ALL
7. TROUBLE CONCENTRATING ON THINGS, SUCH AS READING THE NEWSPAPER OR WATCHING TELEVISION: NOT AT ALL
SUM OF ALL RESPONSES TO PHQ QUESTIONS 1-9: 0
SUM OF ALL RESPONSES TO PHQ QUESTIONS 1-9: 0
9. THOUGHTS THAT YOU WOULD BE BETTER OFF DEAD, OR OF HURTING YOURSELF: NOT AT ALL
8. MOVING OR SPEAKING SO SLOWLY THAT OTHER PEOPLE COULD HAVE NOTICED. OR THE OPPOSITE, BEING SO FIGETY OR RESTLESS THAT YOU HAVE BEEN MOVING AROUND A LOT MORE THAN USUAL: NOT AT ALL
10. IF YOU CHECKED OFF ANY PROBLEMS, HOW DIFFICULT HAVE THESE PROBLEMS MADE IT FOR YOU TO DO YOUR WORK, TAKE CARE OF THINGS AT HOME, OR GET ALONG WITH OTHER PEOPLE: NOT DIFFICULT AT ALL
4. FEELING TIRED OR HAVING LITTLE ENERGY: NOT AT ALL

## 2025-03-20 ASSESSMENT — ENCOUNTER SYMPTOMS
ABDOMINAL PAIN: 0
COUGH: 0
SHORTNESS OF BREATH: 0

## 2025-03-20 ASSESSMENT — LIFESTYLE VARIABLES
HOW MANY STANDARD DRINKS CONTAINING ALCOHOL DO YOU HAVE ON A TYPICAL DAY: PATIENT DOES NOT DRINK
HOW OFTEN DO YOU HAVE A DRINK CONTAINING ALCOHOL: NEVER

## 2025-03-20 NOTE — PATIENT INSTRUCTIONS
Patient presents as a referral from Dr. Bryant Welsh for evaluation of reflux/hiatal hernia.  A copy of this note will be sent to the referring provider.  The patient states that she has had heartburn for a long time - she actually had an EGD done in [place] when she was having heartburn/dysphagia where the endoscopist dilated her esophagus.  Her symptoms seem to resolve after that; she takes occasional Gaviscon for nighttime reflux/dyspepsia.  She saw Dr. Dionicio Cummins who had prescribed pantoprazole (and famotidine) - she states that she could not tolerate those.  Dr. Welsh placed her on Dexilant - it seems to have helped.  She believes that her 3-pound weight gain is related to the Dexilant as well as constipation.  I do not think so - these side effects would be rare and the timing is not quite right.  She had been gaining weight for months before, and her constipation/bloating issues have been going on for a while as well.  She uses OTC laxatives without success.  Dr. Cummins did a colonoscopy last year - 2 polyps removed.
motion in joints and muscles.  Includes upper arm stretches, calf stretches, and gentle yoga.  Aim for at least twice a week, preferably after your muscles are warmed up from other activities.  It can help you function better in daily life.  Balancing.  This helps you stay coordinated and have good posture.  Includes heel-to-toe walking, willie chi, and certain types of yoga.  Aim for at least 3 days a week.  It can reduce your risk of falling.  Even if you have a hard time meeting the recommendations, it's better to be more active than less active. All activity done in each category counts toward your weekly total. You'd be surprised how daily things like carrying groceries, keeping up with grandchildren, and taking the stairs can add up.  What keeps you from being active?  If you've had a hard time being more active, you're not alone. Maybe you remember being able to do more. Or maybe you've never thought of yourself as being active. It's frustrating when you can't do the things you want. Being more active can help. What's holding you back?  Getting started.  Have a goal, but break it into easy tasks. Small steps build into big accomplishments.  Staying motivated.  If you feel like skipping your activity, remember your goal. Maybe you want to move better and stay independent. Every activity gets you one step closer.  Not feeling your best.  Start with 5 minutes of an activity you enjoy. Prove to yourself you can do it. As you get comfortable, increase your time.  You may not be where you want to be. But you're in the process of getting there. Everyone starts somewhere.  How can you find safe ways to stay active?  Talk with your doctor about any physical challenges you're facing. Make a plan with your doctor if you have a health problem or aren't sure how to get started with activity.  If you're already active, ask your doctor if there is anything you should change to stay safe as your body and health change.  If you

## 2025-03-21 NOTE — ADDENDUM NOTE
Addended by: ESTHER BYRNES on: 3/21/2025 05:14 PM     Modules accepted: Orders, Level of Service

## 2025-03-21 NOTE — PROGRESS NOTES
Medicare Annual Wellness Visit    Desiree Goldsmith is here for Medicare AWV (Medication refills and pharmacy confirmed.)    Assessment & Plan   Medicare annual wellness visit, subsequent  -     Comprehensive Metabolic Panel; Future  -     Lipid Panel; Future  -     Vitamin D 25 Hydroxy; Future  Anxiety  -     citalopram (CELEXA) 40 MG tablet; Take 1 tablet by mouth daily, Disp-90 tablet, R-3Normal  Acquired hypothyroidism  -     levothyroxine (SYNTHROID) 50 MCG tablet; Take 1 tablet by mouth Daily TAKE 1 TABLET BY MOUTH DAILY (BEFORE BREAKFAST) FOR HYPOTHYROIDISM, Disp-90 tablet, R-3Normal  -     TSH reflex to FT4; Future  Gastroesophageal reflux disease without esophagitis  -     pantoprazole (PROTONIX) 40 MG tablet; Take 1 tablet by mouth daily, Disp-90 tablet, R-3Normal  Dermatitis  Routine general medical examination at a health care facility  Screening for depression  Autoimmune disease  Fibromyalgia  Other fatigue  -     AMB POC KTY COMPLETE CBC; Future  Dysuria  -     AMB POC URINALYSIS DIP STICK AUTO W/O MICRO; Future  Brief psychotic disorder (HCC)  Stage 3 chronic kidney disease, unspecified whether stage 3a or 3b CKD (HCC)  Localized osteoporosis ([place])  -     Vitamin D 25 Hydroxy; Future  Stress incontinence  -     [facility]       No follow-ups on file.     Subjective   Patient presents office today for complete physical Medicare wellness visit without complaints    Patient's complete Health Risk Assessment and screening values have been reviewed and are found in Flowsheets. The following problems were reviewed today and where indicated follow up appointments were made and/or referrals ordered.    Positive Risk Factor Screenings with Interventions:    Fall Risk:  Do you feel unsteady or are you worried about falling? : (!) yes  2 or more falls in past year?: no  Fall with injury in past year?: no     Interventions:    Supportive care given precautions given       
Provider, MD Chace   triamcinolone (KENALOG) 0.1 % cream Apply on affected areas twice a day Yes Eliud Ambriz DO   Calcium Carbonate-Vitamin D (OYSTER SHELL CALCIUM/D) 500-200 MG-UNIT TABS Take by mouth Yes Automatic Reconciliation, Ar   vitamin D3 (CHOLECALCIFEROL) 10 MCG (400 UNIT) TABS tablet Take by mouth Yes Automatic Reconciliation, Ar   gabapentin (NEURONTIN) 400 MG capsule Take 1 capsule by mouth 4 times daily. Yes Automatic Reconciliation, Ar   naloxone 4 MG/0.1ML LIQD nasal spray ADMINISTER A SINGLE SPRAY IN ONE NOSTRIL UPON SIGNS OF OPIOID OVERDOSE. CALL 911. REPEAT AFTER 3 MINUTES IF NO RESPONSE. Yes Automatic Reconciliation, Ar   spironolactone (ALDACTONE) 50 MG tablet Take 1 tablet by mouth 3 times daily Yes Automatic Reconciliation, Ar   amantadine (SYMMETREL) 100 MG capsule Take 1 capsule by mouth daily  ProviderChace MD   traMADol (ULTRAM) 50 MG tablet TAKE 1 TABLET BY MOUTH 4 TIMES DAILY AS NEEDED FOR PAIN  Provider, Historical, MD       CareTeam (Including outside providers/suppliers regularly involved in providing care):   Patient Care Team:  Eliud Ambriz DO as PCP - General  Eliud Ambriz DO as PCP - Empaneled Provider  Siddharth Leos MD (Pain Management)     Recommendations for Preventive Services Due: see orders and patient instructions/AVS.  Recommended screening schedule for the next 5-10 years is provided to the patient in written form: see Patient Instructions/AVS.     Reviewed and updated this visit:  Allergies  Meds  Sexual Hx

## 2025-04-01 ENCOUNTER — TELEPHONE (OUTPATIENT)
Dept: FAMILY MEDICINE CLINIC | Facility: CLINIC | Age: 75
End: 2025-04-01

## 2025-04-08 ENCOUNTER — TELEPHONE (OUTPATIENT)
Dept: FAMILY MEDICINE CLINIC | Facility: CLINIC | Age: 75
End: 2025-04-08

## 2025-04-08 NOTE — TELEPHONE ENCOUNTER
Left message notifying patient that I have faxed in lab orders. Gave them our fax number and told them to give that to lab juan. They need to fax results to us. And then let us know when they complete lab work so we can schedule follow ups.

## 2025-04-17 ENCOUNTER — OFFICE VISIT (OUTPATIENT)
Dept: UROLOGY | Age: 75
End: 2025-04-17
Payer: MEDICARE

## 2025-04-17 DIAGNOSIS — N39.42 URINARY INCONTINENCE WITHOUT SENSORY AWARENESS: ICD-10-CM

## 2025-04-17 DIAGNOSIS — N39.41 URGE INCONTINENCE: Primary | ICD-10-CM

## 2025-04-17 DIAGNOSIS — R35.0 URINARY FREQUENCY: ICD-10-CM

## 2025-04-17 LAB
BILIRUBIN, URINE, POC: NORMAL
BLOOD URINE, POC: NORMAL
GLUCOSE URINE, POC: NEGATIVE MG/DL
KETONES, URINE, POC: NORMAL MG/DL
LEUKOCYTE ESTERASE, URINE, POC: NORMAL
NITRITE, URINE, POC: NEGATIVE
PH, URINE, POC: 5.5 (ref 4.6–8)
PROTEIN,URINE, POC: 30 MG/DL
SPECIFIC GRAVITY, URINE, POC: 1.03 (ref 1–1.03)
URINALYSIS CLARITY, POC: NORMAL
URINALYSIS COLOR, POC: NORMAL
UROBILINOGEN, POC: NORMAL MG/DL

## 2025-04-17 PROCEDURE — G8417 CALC BMI ABV UP PARAM F/U: HCPCS | Performed by: NURSE PRACTITIONER

## 2025-04-17 PROCEDURE — 3017F COLORECTAL CA SCREEN DOC REV: CPT | Performed by: NURSE PRACTITIONER

## 2025-04-17 PROCEDURE — 0509F URINE INCON PLAN DOCD: CPT | Performed by: NURSE PRACTITIONER

## 2025-04-17 PROCEDURE — 81003 URINALYSIS AUTO W/O SCOPE: CPT | Performed by: NURSE PRACTITIONER

## 2025-04-17 PROCEDURE — 1036F TOBACCO NON-USER: CPT | Performed by: NURSE PRACTITIONER

## 2025-04-17 PROCEDURE — 1090F PRES/ABSN URINE INCON ASSESS: CPT | Performed by: NURSE PRACTITIONER

## 2025-04-17 PROCEDURE — 1123F ACP DISCUSS/DSCN MKR DOCD: CPT | Performed by: NURSE PRACTITIONER

## 2025-04-17 PROCEDURE — 99204 OFFICE O/P NEW MOD 45 MIN: CPT | Performed by: NURSE PRACTITIONER

## 2025-04-17 PROCEDURE — G8400 PT W/DXA NO RESULTS DOC: HCPCS | Performed by: NURSE PRACTITIONER

## 2025-04-17 PROCEDURE — 1160F RVW MEDS BY RX/DR IN RCRD: CPT | Performed by: NURSE PRACTITIONER

## 2025-04-17 PROCEDURE — G8427 DOCREV CUR MEDS BY ELIG CLIN: HCPCS | Performed by: NURSE PRACTITIONER

## 2025-04-17 PROCEDURE — 1159F MED LIST DOCD IN RCRD: CPT | Performed by: NURSE PRACTITIONER

## 2025-04-17 RX ORDER — TROSPIUM CHLORIDE 20 MG/1
20 TABLET, FILM COATED ORAL 2 TIMES DAILY
Qty: 90 TABLET | Refills: 0 | Status: SHIPPED | OUTPATIENT
Start: 2025-04-17

## 2025-04-17 ASSESSMENT — ENCOUNTER SYMPTOMS
EYE PAIN: 0
EYE DISCHARGE: 0
WHEEZING: 0
BACK PAIN: 0
NAUSEA: 0
SHORTNESS OF BREATH: 0
ABDOMINAL PAIN: 0
CONSTIPATION: 0
SKIN LESIONS: 0
BLOOD IN STOOL: 0
DIARRHEA: 0
VOMITING: 0
COUGH: 0
INDIGESTION: 0
HEARTBURN: 0

## 2025-04-17 NOTE — PROGRESS NOTES
[facility]  [address] [address]  [phone]          Desiree Goldsmith  : 1950    Chief Complaint   Patient presents with    New Patient    Incontinence          HPI     Desiree Goldsmith is a 74 y.o. female who has LONG h/o urinary incontinence. This has been going on for about several years. This continues to worsen. Wearing protective underwear for 2 years.     She reports daytime frequency. Unsure how often. Nocturia ranged from 1-3x/night. Some urgency w UUI. Mostly bothered by urinary incontinence wo awareness. She denies ROSITA.    She has seen GYN, Dr Fay, and Uro-GYN, Dr Holland for this issues. She has tried and failed Estradial cream, pelvic floor therapy, and oxybutynin. She has no POP. PVR has been normal.     She is with her .      Past Medical History:   Diagnosis Date    Acquired hypothyroidism     Acute deep vein thrombosis (DVT) of femoral vein of left lower extremity     on Eliquis, secondary to COVID infection 2022    Anxiety     Arthritis     Back and extremities    Autoimmune disease     Chronic pain     Fibromyalgia    CTS (carpal tunnel syndrome)     Depression     Fibromyalgia     GERD (gastroesophageal reflux disease)     Also has IBS    IBS (irritable bowel syndrome)     Menopause     NIKOLAS on CPAP     Other ill-defined conditions(799.39)     Raynaud's and peripheral neuropathy    Psychotic disorder (HCC)     sechizophrinia    Raynaud phenomenon     Single subsegmental pulmonary embolism without acute cor pulmonale (HCC)     Sjogren-Belkys syndrome     Sleep apnea     Thyroid disease     Hypothyroid    TMJ (temporomandibular joint syndrome)     Varicose veins      Past Surgical History:   Procedure Laterality Date    BREAST SURGERY   - placed     breast implants - removed     CHOLECYSTECTOMY  2008    COLONOSCOPY  10/30/2014    Dr. Boss-repeat in 10 years.    COLONOSCOPY N/A 10/5/2022    COLONOSCOPY POLYPECTOMY

## 2025-05-08 DIAGNOSIS — K21.9 GASTROESOPHAGEAL REFLUX DISEASE WITHOUT ESOPHAGITIS: ICD-10-CM

## 2025-05-08 DIAGNOSIS — E03.9 ACQUIRED HYPOTHYROIDISM: ICD-10-CM

## 2025-05-08 RX ORDER — LEVOTHYROXINE SODIUM 50 UG/1
TABLET ORAL
Qty: 90 TABLET | Refills: 0 | OUTPATIENT
Start: 2025-05-08

## 2025-05-08 RX ORDER — PANTOPRAZOLE SODIUM 40 MG/1
TABLET, DELAYED RELEASE ORAL
Qty: 90 TABLET | Refills: 0 | OUTPATIENT
Start: 2025-05-08

## 2025-05-14 ENCOUNTER — OFFICE VISIT (OUTPATIENT)
Dept: UROLOGY | Age: 75
End: 2025-05-14
Payer: MEDICARE

## 2025-05-14 DIAGNOSIS — N39.41 URGE INCONTINENCE: Primary | ICD-10-CM

## 2025-05-14 DIAGNOSIS — R35.0 URINARY FREQUENCY: ICD-10-CM

## 2025-05-14 DIAGNOSIS — N39.42 URINARY INCONTINENCE WITHOUT SENSORY AWARENESS: ICD-10-CM

## 2025-05-14 DIAGNOSIS — N18.9 CHRONIC KIDNEY DISEASE, UNSPECIFIED CKD STAGE: ICD-10-CM

## 2025-05-14 LAB
BILIRUBIN, URINE, POC: NEGATIVE
BLOOD URINE, POC: NORMAL
GLUCOSE URINE, POC: NEGATIVE MG/DL
KETONES, URINE, POC: 15 MG/DL
LEUKOCYTE ESTERASE, URINE, POC: NORMAL
NITRITE, URINE, POC: NEGATIVE
PH, URINE, POC: 5.5 (ref 4.6–8)
PROTEIN,URINE, POC: NORMAL MG/DL
SPECIFIC GRAVITY, URINE, POC: 1.02 (ref 1–1.03)
URINALYSIS CLARITY, POC: NORMAL
URINALYSIS COLOR, POC: NORMAL
UROBILINOGEN, POC: NORMAL MG/DL

## 2025-05-14 PROCEDURE — 3017F COLORECTAL CA SCREEN DOC REV: CPT | Performed by: NURSE PRACTITIONER

## 2025-05-14 PROCEDURE — G8400 PT W/DXA NO RESULTS DOC: HCPCS | Performed by: NURSE PRACTITIONER

## 2025-05-14 PROCEDURE — G8427 DOCREV CUR MEDS BY ELIG CLIN: HCPCS | Performed by: NURSE PRACTITIONER

## 2025-05-14 PROCEDURE — 1159F MED LIST DOCD IN RCRD: CPT | Performed by: NURSE PRACTITIONER

## 2025-05-14 PROCEDURE — 1090F PRES/ABSN URINE INCON ASSESS: CPT | Performed by: NURSE PRACTITIONER

## 2025-05-14 PROCEDURE — 1123F ACP DISCUSS/DSCN MKR DOCD: CPT | Performed by: NURSE PRACTITIONER

## 2025-05-14 PROCEDURE — 1160F RVW MEDS BY RX/DR IN RCRD: CPT | Performed by: NURSE PRACTITIONER

## 2025-05-14 PROCEDURE — 81003 URINALYSIS AUTO W/O SCOPE: CPT | Performed by: NURSE PRACTITIONER

## 2025-05-14 PROCEDURE — 1036F TOBACCO NON-USER: CPT | Performed by: NURSE PRACTITIONER

## 2025-05-14 PROCEDURE — G8417 CALC BMI ABV UP PARAM F/U: HCPCS | Performed by: NURSE PRACTITIONER

## 2025-05-14 PROCEDURE — 99214 OFFICE O/P EST MOD 30 MIN: CPT | Performed by: NURSE PRACTITIONER

## 2025-05-14 PROCEDURE — 0509F URINE INCON PLAN DOCD: CPT | Performed by: NURSE PRACTITIONER

## 2025-05-14 RX ORDER — TROSPIUM CHLORIDE 20 MG/1
20 TABLET, FILM COATED ORAL 2 TIMES DAILY
Qty: 180 TABLET | Refills: 1 | Status: SHIPPED | OUTPATIENT
Start: 2025-05-14 | End: 2025-08-12

## 2025-05-14 ASSESSMENT — ENCOUNTER SYMPTOMS
NAUSEA: 0
BACK PAIN: 0

## 2025-05-14 NOTE — PROGRESS NOTES
[facility]  [address] [address]  [phone]          Desiree Goldsmith  : 1950    Chief Complaint   Patient presents with    Follow-up          HPI     Desiree Goldsmith is a 74 y.o. female who has LONG h/o urinary incontinence. This has been going on for about several years. This continues to worsen. Wearing protective underwear for 2 years.      She reports daytime frequency. Unsure how often. Nocturia ranged from 1-3x/night. Some urgency w UUI. Mostly bothered by urinary incontinence wo awareness. She denies ROSITA.     She has seen GYN, Dr Fay, and Uro-GYN, Dr Holland for this issues. She has tried and failed Estradial cream, pelvic floor therapy, and oxybutynin. She has no POP. PVR has been normal. Started on trospium. She reports great success!     She is with her .    She wants referral to nephrology for CKD.     Lab Results   Component Value Date    CREATININE 1.30 (H) 2023    CREATININE 1.20 (H) 2023    CREATININE 1.40 (H) 2023           Past Medical History:   Diagnosis Date    Acquired hypothyroidism     Acute deep vein thrombosis (DVT) of femoral vein of left lower extremity (HCC)     on Eliquis, secondary to COVID infection 2022    Anxiety     Arthritis     Back and extremities    Autoimmune disease     Chronic pain     Fibromyalgia    CTS (carpal tunnel syndrome)     Depression     Fibromyalgia     GERD (gastroesophageal reflux disease)     Also has IBS    IBS (irritable bowel syndrome)     Menopause     NIKOLAS on CPAP     Other ill-defined conditions(179.03)     Raynaud's and peripheral neuropathy    Psychotic disorder (HCC)     sechizophrinia    Raynaud phenomenon     Single subsegmental pulmonary embolism without acute cor pulmonale (HCC)     Sjogren-Belkys syndrome     Sleep apnea     Thyroid disease     Hypothyroid    TMJ (temporomandibular joint syndrome)     Varicose veins      Past Surgical History:   Procedure

## 2025-06-12 ENCOUNTER — TELEPHONE (OUTPATIENT)
Dept: FAMILY MEDICINE CLINIC | Facility: CLINIC | Age: 75
End: 2025-06-12

## 2025-06-13 ENCOUNTER — TELEPHONE (OUTPATIENT)
Dept: FAMILY MEDICINE CLINIC | Facility: CLINIC | Age: 75
End: 2025-06-13

## 2025-07-07 ENCOUNTER — TELEPHONE (OUTPATIENT)
Dept: FAMILY MEDICINE CLINIC | Facility: CLINIC | Age: 75
End: 2025-07-07

## 2025-07-07 NOTE — TELEPHONE ENCOUNTER
Patient called requesting a medication that her GI Dr. Fabian from [facility] use to prescribe. They told her she would need to come in their office. P: [phone]   cholestyramine white powder 4 grams 60 gram packets  Told her dr. Ambriz doesn't prescribe that appointment and she needs to call her Dr. Fabian or call [facility]. Patient verbalized understanding.

## 2025-07-08 LAB
25(OH)D3+25(OH)D2 SERPL-MCNC: 29.6 NG/ML (ref 30–100)
ALBUMIN SERPL-MCNC: 4.3 G/DL (ref 3.8–4.8)
ALP SERPL-CCNC: 86 IU/L (ref 44–121)
ALT SERPL-CCNC: 61 IU/L (ref 0–32)
AST SERPL-CCNC: 35 IU/L (ref 0–40)
BILIRUB SERPL-MCNC: 0.9 MG/DL (ref 0–1.2)
BUN SERPL-MCNC: 15 MG/DL (ref 8–27)
BUN/CREAT SERPL: 14 (ref 12–28)
CALCIUM SERPL-MCNC: 9.1 MG/DL (ref 8.7–10.3)
CHLORIDE SERPL-SCNC: 103 MMOL/L (ref 96–106)
CHOLEST SERPL-MCNC: 176 MG/DL (ref 100–199)
CO2 SERPL-SCNC: 21 MMOL/L (ref 20–29)
CREAT SERPL-MCNC: 1.09 MG/DL (ref 0.57–1)
EGFRCR SERPLBLD CKD-EPI 2021: 54 ML/MIN/1.73
GLOBULIN SER CALC-MCNC: 2.1 G/DL (ref 1.5–4.5)
GLUCOSE SERPL-MCNC: 92 MG/DL (ref 70–99)
HDLC SERPL-MCNC: 41 MG/DL
LDLC SERPL CALC-MCNC: 100 MG/DL (ref 0–99)
POTASSIUM SERPL-SCNC: 4.2 MMOL/L (ref 3.5–5.2)
PROT SERPL-MCNC: 6.4 G/DL (ref 6–8.5)
SODIUM SERPL-SCNC: 141 MMOL/L (ref 134–144)
TRIGL SERPL-MCNC: 200 MG/DL (ref 0–149)
TSH SERPL DL<=0.005 MIU/L-ACNC: 1.04 UIU/ML (ref 0.45–4.5)
VLDLC SERPL CALC-MCNC: 35 MG/DL (ref 5–40)

## 2025-07-14 ENCOUNTER — OFFICE VISIT (OUTPATIENT)
Dept: FAMILY MEDICINE CLINIC | Facility: CLINIC | Age: 75
End: 2025-07-14
Payer: MEDICARE

## 2025-07-14 VITALS
OXYGEN SATURATION: 95 % | HEIGHT: 64 IN | SYSTOLIC BLOOD PRESSURE: 100 MMHG | WEIGHT: 172 LBS | DIASTOLIC BLOOD PRESSURE: 70 MMHG | BODY MASS INDEX: 29.37 KG/M2

## 2025-07-14 DIAGNOSIS — E78.49 HYPERLIPIDEMIA, FAMILIAL, HIGH LDL: Primary | ICD-10-CM

## 2025-07-14 PROCEDURE — 1123F ACP DISCUSS/DSCN MKR DOCD: CPT | Performed by: FAMILY MEDICINE

## 2025-07-14 PROCEDURE — 99213 OFFICE O/P EST LOW 20 MIN: CPT | Performed by: FAMILY MEDICINE

## 2025-07-14 PROCEDURE — G8427 DOCREV CUR MEDS BY ELIG CLIN: HCPCS | Performed by: FAMILY MEDICINE

## 2025-07-14 PROCEDURE — 1159F MED LIST DOCD IN RCRD: CPT | Performed by: FAMILY MEDICINE

## 2025-07-14 PROCEDURE — G8417 CALC BMI ABV UP PARAM F/U: HCPCS | Performed by: FAMILY MEDICINE

## 2025-07-14 PROCEDURE — 1090F PRES/ABSN URINE INCON ASSESS: CPT | Performed by: FAMILY MEDICINE

## 2025-07-14 PROCEDURE — G8400 PT W/DXA NO RESULTS DOC: HCPCS | Performed by: FAMILY MEDICINE

## 2025-07-14 PROCEDURE — 3017F COLORECTAL CA SCREEN DOC REV: CPT | Performed by: FAMILY MEDICINE

## 2025-07-14 PROCEDURE — 1036F TOBACCO NON-USER: CPT | Performed by: FAMILY MEDICINE

## 2025-07-14 ASSESSMENT — ENCOUNTER SYMPTOMS
VOMITING: 0
SHORTNESS OF BREATH: 0
NAUSEA: 0

## 2025-07-14 NOTE — PROGRESS NOTES
PROGRESS NOTE    SUBJECTIVE:   Desiree Goldsmith is a 75 y.o. female seen for a follow up visit regarding the following chief complaint:     Chief Complaint   Patient presents with    Follow-up     Labs follow up           HPI patient presents office today to go over lab results without any new complaints      Past Medical History, Past Surgical History, Family history, Social History, and Medications were all reviewed with the patient today and updated as necessary.       Current Outpatient Medications   Medication Sig Dispense Refill    trospium (SANCTURA) 20 MG tablet Take 1 tablet by mouth 2 times daily 180 tablet 1    risperiDONE (RISPERDAL) 0.5 MG tablet       citalopram (CELEXA) 40 MG tablet Take 1 tablet by mouth daily 90 tablet 3    levothyroxine (SYNTHROID) 50 MCG tablet Take 1 tablet by mouth Daily TAKE 1 TABLET BY MOUTH DAILY (BEFORE BREAKFAST) FOR HYPOTHYROIDISM 90 tablet 3    pantoprazole (PROTONIX) 40 MG tablet Take 1 tablet by mouth daily 90 tablet 3    amantadine (SYMMETREL) 100 MG capsule Take 1 capsule by mouth daily      minoxidil (LONITEN) 2.5 MG tablet       diazePAM (VALIUM) 10 MG tablet Take half tab po qam and one tab po q evenings.      CPAP Machine MISC       triamcinolone (KENALOG) 0.1 % cream Apply on affected areas twice a day 30 g 5    traMADol (ULTRAM) 50 MG tablet TAKE 1 TABLET BY MOUTH 4 TIMES DAILY AS NEEDED FOR PAIN      Calcium Carbonate-Vitamin D (OYSTER SHELL CALCIUM/D) 500-200 MG-UNIT TABS Take by mouth      vitamin D3 (CHOLECALCIFEROL) 10 MCG (400 UNIT) TABS tablet Take by mouth      gabapentin (NEURONTIN) 400 MG capsule Take 1 capsule by mouth 4 times daily.      naloxone 4 MG/0.1ML LIQD nasal spray ADMINISTER A SINGLE SPRAY IN ONE NOSTRIL UPON SIGNS OF OPIOID OVERDOSE. CALL 911. REPEAT AFTER 3 MINUTES IF NO RESPONSE.      spironolactone (ALDACTONE) 50 MG tablet Take 1 tablet by mouth 3 times daily       No current facility-administered medications for this visit.

## (undated) DEVICE — SNARE POLYP SM W13MMXL240CM SHTH DIA2.4MM OVL FLX DISP

## (undated) DEVICE — YANKAUER,BULB TIP,W/O VENT,RIGID,STERILE: Brand: MEDLINE

## (undated) DEVICE — SYRINGE MED 10ML LUERLOCK TIP W/O SFTY DISP

## (undated) DEVICE — TRAP ENDOSCP POLYP SINGLE CHMBR DRAWER TYP

## (undated) DEVICE — CONNECTOR TBNG OD5-7MM O2 END DISP

## (undated) DEVICE — LUBE JELLY FOIL PACK 1.4 OZ: Brand: MEDLINE INDUSTRIES, INC.

## (undated) DEVICE — ELECTRODE PT RET AD L9FT HI MOIST COND ADH HYDRGEL CORDED

## (undated) DEVICE — SINGLE PORT MANIFOLD: Brand: NEPTUNE 2

## (undated) DEVICE — FORCEPS BX L L240CM DIA2.4MM RAD JAW 4 HOT FOR POLYP DISP

## (undated) DEVICE — KENDALL RADIOLUCENT FOAM MONITORING ELECTRODE RECTANGULAR SHAPE: Brand: KENDALL

## (undated) DEVICE — CANNULA NSL ORAL AD FOR CAPNOFLEX CO2 O2 AIRLFE

## (undated) DEVICE — CONTAINER FORMALIN PREFILLED 10% NBF 60ML

## (undated) DEVICE — NEEDLE SYR 18GA L1.5IN RED PLAS HUB S STL BLNT FILL W/O

## (undated) DEVICE — SYRINGE MED 3ML CLR PLAS STD N CTRL LUERLOCK TIP DISP

## (undated) DEVICE — AIRLIFE™ OXYGEN TUBING 7 FEET (2.1 M) CRUSH RESISTANT OXYGEN TUBING, VINYL TIPPED: Brand: AIRLIFE™

## (undated) DEVICE — GAUZE,SPONGE,4"X4",12PLY,WOVEN,NS,LF: Brand: MEDLINE

## (undated) DEVICE — SYRINGE, LUER SLIP, STERILE, 60ML: Brand: MEDLINE